# Patient Record
Sex: FEMALE | Race: WHITE | URBAN - METROPOLITAN AREA
[De-identification: names, ages, dates, MRNs, and addresses within clinical notes are randomized per-mention and may not be internally consistent; named-entity substitution may affect disease eponyms.]

---

## 2017-02-22 ENCOUNTER — OFFICE VISIT (OUTPATIENT)
Dept: FAMILY MEDICINE | Facility: CLINIC | Age: 32
End: 2017-02-22

## 2017-02-22 VITALS
SYSTOLIC BLOOD PRESSURE: 123 MMHG | TEMPERATURE: 98.6 F | HEART RATE: 82 BPM | OXYGEN SATURATION: 96 % | BODY MASS INDEX: 40.71 KG/M2 | DIASTOLIC BLOOD PRESSURE: 76 MMHG | WEIGHT: 226.2 LBS

## 2017-02-22 DIAGNOSIS — M77.8 TENDINITIS OF EXTENSOR TENDON OF RIGHT HAND: Primary | ICD-10-CM

## 2017-02-22 DIAGNOSIS — J45.30 MILD PERSISTENT ASTHMA WITHOUT COMPLICATION: ICD-10-CM

## 2017-02-22 DIAGNOSIS — J31.0 CHRONIC RHINITIS: ICD-10-CM

## 2017-02-22 RX ORDER — FLUTICASONE PROPIONATE 110 UG/1
2 AEROSOL, METERED RESPIRATORY (INHALATION) 2 TIMES DAILY
Qty: 3 INHALER | Refills: 1 | Status: SHIPPED
Start: 2017-02-22 | End: 2017-11-30

## 2017-02-22 RX ORDER — FLUTICASONE PROPIONATE 50 MCG
1-2 SPRAY, SUSPENSION (ML) NASAL DAILY
Qty: 16 G | Refills: 2 | Status: SHIPPED
Start: 2017-02-22 | End: 2018-04-30

## 2017-02-22 RX ORDER — ALBUTEROL SULFATE 90 UG/1
2 AEROSOL, METERED RESPIRATORY (INHALATION) EVERY 6 HOURS PRN
Qty: 3 INHALER | Refills: 1 | Status: SHIPPED
Start: 2017-02-22 | End: 2018-09-19

## 2017-02-22 RX ORDER — MONTELUKAST SODIUM 10 MG/1
10 TABLET ORAL AT BEDTIME
Qty: 90 TABLET | Refills: 3 | Status: SHIPPED
Start: 2017-02-22 | End: 2018-05-02

## 2017-02-22 RX ORDER — IBUPROFEN 800 MG/1
800 TABLET, FILM COATED ORAL EVERY 8 HOURS PRN
Qty: 30 TABLET | Refills: 0 | Status: SHIPPED
Start: 2017-02-22 | End: 2017-03-28

## 2017-02-22 NOTE — MR AVS SNAPSHOT
After Visit Summary   2/22/2017    Tg Calero    MRN: [id number]           Patient Information     Date Of Birth          1985        Visit Information        Provider Department      2/22/2017 2:40 PM Macey Cotton, DO [facility]        Today's Diagnoses     Tendinitis of extensor tendon of right hand    -  1    Mild persistent asthma without complication        Chronic rhinitis          Care Instructions      My Asthma Action Plan  Name: Tg Calero   YOB: 1985  Date: 2/22/2017   My doctor: Iman Fagan   My clinic: [facility]      My Control Medicine:   Fluticasone propionate (Flovent) -   mcg  Montelukast (Singulair) -  10 mg        Dose: 1 tab QHS  My Rescue Medicine: Albuterol (Proair/Ventolin/Proventil) HFA        Dose: PRN   My Asthma Severity: mild persistent  Avoid your asthma triggers: smoke, upper respiratory infections, dust mites, pollens, animal dander, mold, humidity, strong odors and fumes and exercise or sports        GREEN ZONE   Good Control    I feel good    No cough or wheeze    Can work, sleep and play without asthma symptoms       Take your asthma control medicine every day.     1. If exercise triggers your asthma, take your rescue medication    15 minutes before exercise or sports, and    During exercise if you have asthma symptoms  2. Spacer to use with inhaler: If you have a spacer, make sure to use it with your inhaler             YELLOW ZONE Getting Worse  I have ANY of these:    I do not feel good    Cough or wheeze    Chest feels tight    Wake up at night   1. Keep taking your Green Zone medications  2. Start taking your rescue medicine:    every 20 minutes for up to 1 hour. Then every 4 hours for 24-48 hours.  3. If you stay in the Yellow Zone for more than 12-24 hours, contact your doctor.  4. If you do not return to the Green Zone in 12-24 hours or you get worse, start taking your oral steroid  medicine if prescribed by your provider.           RED ZONE Medical Alert - Get Help  I have ANY of these:    I feel awful    Medicine is not helping    Breathing getting harder    Trouble walking or talking    Nose opens wide to breathe       1. Take your rescue medicine NOW  2. If your provider has prescribed an oral steroid medicine, start taking it NOW  3. Call your doctor NOW  4. If you are still in the Red Zone after 20 minutes and you have not reached your doctor:    Take your rescue medicine again and    Call 911 or go to the emergency room right away    See your regular doctor within 2 weeks of an Emergency Room or Urgent Care visit for follow-up treatment.        The above medication may be given at school or day care?: Yes and N/A (Adult Patient)  Child can carry and use inhaler(s) at school with approval of school nurse?: Yes and N/A (Adult Patient)    Electronically signed by: Macey Cotton, February 22, 2017    Annual Reminders:  Meet with Asthma Educator,  Flu Shot in the Fall, consider Pneumonia Vaccination for patients with asthma (aged 19 and older).    Pharmacy: [id number] - [place], [place] - [address] AT [facility]                    Asthma Triggers  How To Control Things That Make Your Asthma Worse    Triggers are things that make your asthma worse.  Look at the list below to help you find your triggers and what you can do about them.  You can help prevent asthma flare-ups by staying away from your triggers.      Trigger                                                          What you can do   Cigarette Smoke  Tobacco smoke can make asthma worse. Do not allow smoking in your home, car or around you.  Be sure no one smokes at a child s day care or school.  If you smoke, ask your health care provider for ways to help you quit.  Ask family members to quit too.  Ask your health care provider for a referral to Quit Plan to help you quit smoking, or  call 0-507-345-BOGZ.     Colds, Flu, Bronchitis  These are common triggers of asthma. Wash your hands often.  Don t touch your eyes, nose or mouth.  Get a flu shot every year.     Dust Mites  These are tiny bugs that live in cloth or carpet. They are too small to see. Wash sheets and blankets in hot water every week.   Encase pillows and mattress in dust mite proof covers.  Avoid having carpet if you can. If you have carpet, vacuum weekly.   Use a dust mask and HEPA vacuum.   Pollen and Outdoor Mold  Some people are allergic to trees, grass, or weed pollen, or molds. Try to keep your windows closed.  Limit time out doors when pollen count is high.   Ask you health care provider about taking medicine during allergy season.     Animal Dander  Some people are allergic to skin flakes, urine or saliva from pets with fur or feathers. Keep pets with fur or feathers out of your home.    If you can t keep the pet outdoors, then keep the pet out of your bedroom.  Keep the bedroom door closed.  Keep pets off cloth furniture and away from stuffed toys.     Mice, Rats, and Cockroaches  Some people are allergic to the waste from these pests.   Cover food and garbage.  Clean up spills and food crumbs.  Store grease in the refrigerator.   Keep food out of the bedroom.   Indoor Mold  This can be a trigger if your home has high moisture. Fix leaking faucets, pipes, or other sources of water.   Clean moldy surfaces.  Dehumidify basement if it is damp and smelly.   Smoke, Strong Odors, and Sprays  These can reduce air quality. Stay away from strong odors and sprays, such as perfume, powder, hair spray, paints, smoke incense, paint, cleaning products, candles and new carpet.   Exercise or Sports  Some people with asthma have this trigger. Be active!  Ask your doctor about taking medicine before sports or exercise to prevent symptoms.    Warm up for 5-10 minutes before and after sports or exercise.     Other Triggers of Asthma  Cold air:   Cover your nose and mouth with a scarf.  Sometimes laughing or crying can be a trigger.  Some medicines and food can trigger asthma.           Follow-ups after your visit        Follow-up notes from your care team     Return in about 2 weeks (around 3/8/2017) for Physical Exam & recheck wrist.      Who to contact     Please call your clinic at [phone] to:    Ask questions about your health    Make or cancel appointments    Discuss your medicines    Learn about your test results    Speak to your doctor   If you have compliments or concerns about an experience at your clinic, or if you wish to file a complaint, please contact [facility] Patient Relations at [phone] or email us at [email or web address]         Additional Information About Your Visit        [employer] Information     [employer] gives you secure access to your electronic health record. If you see a primary care provider, you can also send messages to your care team and make appointments. If you have questions, please call your primary care clinic.  If you do not have a primary care provider, please call [phone] and they will assist you.      [employer] is an electronic gateway that provides easy, online access to your medical records. With [employer], you can request a clinic appointment, read your test results, renew a prescription or communicate with your care team.     To access your existing account, please contact your [facility] or call [phone] for assistance.        Care EveryWhere ID     This is your Care EveryWhere ID. This could be used by other organizations to access your [facility] medical records  [id number]        Your Vitals Were     Pulse Temperature Pulse Oximetry BMI (Body Mass Index)          82 98.6  F (37  C) (Oral) 96% 40.71 kg/m2         Blood Pressure from Last 3 Encounters:   02/22/17 123/76   06/08/16 127/80   12/07/15 143/83    Weight from Last 3  Encounters:   02/22/17 226 lb 3.2 oz (102.6 kg)   06/08/16 214 lb 12.8 oz (97.4 kg)   12/07/15 212 lb (96.2 kg)              Today, you had the following     No orders found for display         Today's Medication Changes          These changes are accurate as of: 2/22/17  4:22 PM.  If you have any questions, ask your nurse or doctor.               These medicines have changed or have updated prescriptions.        Dose/Directions    * ibuprofen 800 MG tablet   Commonly known as:  ADVIL/MOTRIN   This may have changed:  Another medication with the same name was added. Make sure you understand how and when to take each.   Changed by:  Lynne Holloway MD        Dose:  800 mg   Take 1 tablet (800 mg) by mouth every 8 hours as needed for moderate pain   Quantity:  30 tablet   Refills:  1       * ibuprofen 800 MG tablet   Commonly known as:  ADVIL/MOTRIN   This may have changed:  You were already taking a medication with the same name, and this prescription was added. Make sure you understand how and when to take each.   Used for:  Tendinitis of extensor tendon of right hand   Changed by:  Macey Cotton DO        Dose:  800 mg   Take 1 tablet (800 mg) by mouth every 8 hours as needed for moderate pain   Quantity:  30 tablet   Refills:  0       order for DME   This may have changed:  additional instructions   Used for:  Tendinitis of extensor tendon of right hand   Changed by:  Macey Cotton DO        Equipment being ordered: carpal tunnel brace right wrist   Quantity:  1 each   Refills:  0       * Notice:  This list has 2 medication(s) that are the same as other medications prescribed for you. Read the directions carefully, and ask your doctor or other care provider to review them with you.      Stop taking these medicines if you haven't already. Please contact your care team if you have questions.     Dextromethorphan-Guaifenesin  MG/5ML Liqd   Stopped by:  Macey Cotton DO           naproxen 500  MG tablet   Commonly known as:  NAPROSYN   Stopped by:  Macey Cotton, DO           Tennis Elbow Neoprene Brace Misc   Stopped by:  Macey Cotton, DO                Where to get your medicines      These medications were sent to [id number] - [place], [place] - [address] [place] [address] AT [facility]  [address], [place] [address]     Phone:  [phone]     albuterol 108 (90 BASE) MCG/ACT Inhaler    fluticasone 110 MCG/ACT Inhaler    fluticasone 50 MCG/ACT spray    ibuprofen 800 MG tablet    montelukast 10 MG tablet         Some of these will need a paper prescription and others can be bought over the counter.  Ask your nurse if you have questions.     Bring a paper prescription for each of these medications     order for DME                Primary Care Provider Office Phone # Fax #    Iman Fagan [phone]       [facility] [address] [facility] [address]        Thank you!     Thank you for choosing [facility]  for your care. Our goal is always to provide you with excellent care. Hearing back from our patients is one way we can continue to improve our services. Please take a few minutes to complete the written survey that you may receive in the mail after your visit with us. Thank you!             Your Updated Medication List - Protect others around you: Learn how to safely use, store and throw away your medicines at www.disposemymeds.org.          This list is accurate as of: 2/22/17  4:22 PM.  Always use your most recent med list.                   Brand Name Dispense Instructions for use    albuterol 108 (90 BASE) MCG/ACT Inhaler    PROAIR HFA/PROVENTIL HFA/VENTOLIN HFA    3 Inhaler    Inhale 2 puffs into the lungs every 6 hours as needed for shortness of breath / dyspnea       fluticasone 110 MCG/ACT Inhaler    FLOVENT HFA    3 Inhaler    Inhale 2 puffs into the lungs 2 times daily 2 puffs        fluticasone 50 MCG/ACT spray    FLONASE    16 g    Spray 1-2 sprays into both nostrils daily       * ibuprofen 800 MG tablet    ADVIL/MOTRIN    30 tablet    Take 1 tablet (800 mg) by mouth every 8 hours as needed for moderate pain       * ibuprofen 800 MG tablet    ADVIL/MOTRIN    30 tablet    Take 1 tablet (800 mg) by mouth every 8 hours as needed for moderate pain       ketotifen 0.025 % Soln ophthalmic solution    ZADITOR    1 Bottle    Place 1 drop into both eyes every 12 hours       metFORMIN 500 MG tablet    GLUCOPHAGE    180 tablet    Take 1 tablet (500 mg) by mouth 2 times daily (with meals)       montelukast 10 MG tablet    SINGULAIR    90 tablet    Take 1 tablet (10 mg) by mouth At Bedtime       order for DME     1 each    Equipment being ordered: carpal tunnel brace right wrist       * Notice:  This list has 2 medication(s) that are the same as other medications prescribed for you. Read the directions carefully, and ask your doctor or other care provider to review them with you.

## 2017-02-22 NOTE — PATIENT INSTRUCTIONS
My Asthma Action Plan  Name: Tg Calero   YOB: 1985  Date: 2/22/2017   My doctor: Iman Fagan   My clinic: [facility]      My Control Medicine:   Fluticasone propionate (Flovent) -   mcg  Montelukast (Singulair) -  10 mg        Dose: 1 tab QHS  My Rescue Medicine: Albuterol (Proair/Ventolin/Proventil) HFA        Dose: PRN   My Asthma Severity: mild persistent  Avoid your asthma triggers: smoke, upper respiratory infections, dust mites, pollens, animal dander, mold, humidity, strong odors and fumes and exercise or sports        GREEN ZONE   Good Control    I feel good    No cough or wheeze    Can work, sleep and play without asthma symptoms       Take your asthma control medicine every day.     1. If exercise triggers your asthma, take your rescue medication    15 minutes before exercise or sports, and    During exercise if you have asthma symptoms  2. Spacer to use with inhaler: If you have a spacer, make sure to use it with your inhaler             YELLOW ZONE Getting Worse  I have ANY of these:    I do not feel good    Cough or wheeze    Chest feels tight    Wake up at night   1. Keep taking your Green Zone medications  2. Start taking your rescue medicine:    every 20 minutes for up to 1 hour. Then every 4 hours for 24-48 hours.  3. If you stay in the Yellow Zone for more than 12-24 hours, contact your doctor.  4. If you do not return to the Green Zone in 12-24 hours or you get worse, start taking your oral steroid medicine if prescribed by your provider.           RED ZONE Medical Alert - Get Help  I have ANY of these:    I feel awful    Medicine is not helping    Breathing getting harder    Trouble walking or talking    Nose opens wide to breathe       1. Take your rescue medicine NOW  2. If your provider has prescribed an oral steroid medicine, start taking it NOW  3. Call your doctor NOW  4. If you are still in the Red Zone after 20 minutes and you have not  reached your doctor:    Take your rescue medicine again and    Call 911 or go to the emergency room right away    See your regular doctor within 2 weeks of an Emergency Room or Urgent Care visit for follow-up treatment.        The above medication may be given at school or day care?: Yes and N/A (Adult Patient)  Child can carry and use inhaler(s) at school with approval of school nurse?: Yes and N/A (Adult Patient)    Electronically signed by: Macey Cotton, February 22, 2017    Annual Reminders:  Meet with Asthma Educator,  Flu Shot in the Fall, consider Pneumonia Vaccination for patients with asthma (aged 19 and older).    Pharmacy: [id number] - [place], [place] - [address] AT [facility] & [facility]                    Asthma Triggers  How To Control Things That Make Your Asthma Worse    Triggers are things that make your asthma worse.  Look at the list below to help you find your triggers and what you can do about them.  You can help prevent asthma flare-ups by staying away from your triggers.      Trigger                                                          What you can do   Cigarette Smoke  Tobacco smoke can make asthma worse. Do not allow smoking in your home, car or around you.  Be sure no one smokes at a child s day care or school.  If you smoke, ask your health care provider for ways to help you quit.  Ask family members to quit too.  Ask your health care provider for a referral to Quit Plan to help you quit smoking, or call 5-854-321-PLAN.     Colds, Flu, Bronchitis  These are common triggers of asthma. Wash your hands often.  Don t touch your eyes, nose or mouth.  Get a flu shot every year.     Dust Mites  These are tiny bugs that live in cloth or carpet. They are too small to see. Wash sheets and blankets in hot water every week.   Encase pillows and mattress in dust mite proof covers.  Avoid having carpet if you can. If you have carpet, vacuum weekly.   Use a  dust mask and HEPA vacuum.   Pollen and Outdoor Mold  Some people are allergic to trees, grass, or weed pollen, or molds. Try to keep your windows closed.  Limit time out doors when pollen count is high.   Ask you health care provider about taking medicine during allergy season.     Animal Dander  Some people are allergic to skin flakes, urine or saliva from pets with fur or feathers. Keep pets with fur or feathers out of your home.    If you can t keep the pet outdoors, then keep the pet out of your bedroom.  Keep the bedroom door closed.  Keep pets off cloth furniture and away from stuffed toys.     Mice, Rats, and Cockroaches  Some people are allergic to the waste from these pests.   Cover food and garbage.  Clean up spills and food crumbs.  Store grease in the refrigerator.   Keep food out of the bedroom.   Indoor Mold  This can be a trigger if your home has high moisture. Fix leaking faucets, pipes, or other sources of water.   Clean moldy surfaces.  Dehumidify basement if it is damp and smelly.   Smoke, Strong Odors, and Sprays  These can reduce air quality. Stay away from strong odors and sprays, such as perfume, powder, hair spray, paints, smoke incense, paint, cleaning products, candles and new carpet.   Exercise or Sports  Some people with asthma have this trigger. Be active!  Ask your doctor about taking medicine before sports or exercise to prevent symptoms.    Warm up for 5-10 minutes before and after sports or exercise.     Other Triggers of Asthma  Cold air:  Cover your nose and mouth with a scarf.  Sometimes laughing or crying can be a trigger.  Some medicines and food can trigger asthma.

## 2017-02-22 NOTE — PROGRESS NOTES
HPI:       Tg Calero is a 31 year old  female with a significant past medical history of asthma who presents for the following:     RIGHT WRIST PAIN -   - History of carpal tunnel 3 or 4 years ago, improved with splints. Lost right wrist splint.   - Symptoms started a few days ago after chopping chicken about 30 minutes.   - Pain is located on dorsal aspect of wrist across the back from radial to ulnar side.   - No pain at rest, but pain with activity. 6/10 at worst. Throbbing intermittent 3-4/10.   - Pain does not radiate.   - No numbness or tingling.   - No weakness.   - Has not taken any medications, ice, or heat.   - Used wrist splint upside down to immobilize, and this helped.     ASTHMA -   - ACT reviewed, 23 - controlled symptoms   - uses albuterol when sick or when physically active (exertion)  - requests refill of all asthma medications         PMHX:     Patient Active Problem List   Diagnosis     Allergic rhinitis due to animal dander     Irregular menses     PCOS (polycystic ovarian syndrome)     Mild persistent asthma     Vitamin D deficiency disease     Obese     Female infertility     Tennis elbow, right     Pap smear for cervical cancer screening     Current Outpatient Prescriptions   Medication     fluticasone (FLOVENT HFA) 110 MCG/ACT Inhaler     fluticasone (FLONASE) 50 MCG/ACT spray     montelukast (SINGULAIR) 10 MG tablet     albuterol (PROAIR HFA/PROVENTIL HFA/VENTOLIN HFA) 108 (90 BASE) MCG/ACT Inhaler     ibuprofen (ADVIL/MOTRIN) 800 MG tablet     order for DME     ketotifen (ZADITOR) 0.025 % SOLN ophthalmic solution     metFORMIN (GLUCOPHAGE) 500 MG tablet     ibuprofen (ADVIL,MOTRIN) 800 MG tablet     No current facility-administered medications for this visit.       Allergies   Allergen Reactions     Animal Dander      Seasonal Allergies             Review of Systems:   RESPIRATORY: no coughing, no wheezing, no shortness of breath  MSK: right wrist pain, decreased range of  motion          Physical Exam:     /76 (BP Location: Left arm, Patient Position: Chair, Cuff Size: Adult Large)  Pulse 82  Temp 98.6  F (37  C) (Oral)  Wt 226 lb 3.2 oz (102.6 kg)  SpO2 96%  BMI 40.71 kg/m2  GENERAL: alert, oriented, obese, no acute distress  HEART: RRR, no MRG  LUNGS: CTAB, unlabored  MSK: Right dorsal wrist is tender to palpation. Wrist flexion and extension is limited. Finger flexion and extension is intact.   NEURO: hand sensation intact in all dermatomes; muscle strength 5/5 in wrist flexion, wrist extension, finger flexion, and pinky abduction     Assessment and Plan     1. Tendinitis of extensor tendon of right hand  Symptoms most consistent with tendonitis of wrist extensors of right hand secondary to overuse. Will ice, start antiinflammatories with ibuprofen, and immobilize with wrist splint. Anticipate significant improvement in symptoms over the next week.   - ibuprofen (ADVIL/MOTRIN) 800 MG tablet; Take 1 tablet (800 mg) by mouth every 8 hours as needed for moderate pain  Dispense: 30 tablet; Refill: 0  - order for DME; Equipment being ordered: carpal tunnel brace right wrist  Dispense: 1 each; Refill: 0  - WRIST COCK-UP NON-MOLDED    2. Mild persistent asthma without complication  Due for AAP. Reviewed ACT. Symptoms are well controlled. Renewed asthma medications and filled out AAP. See AVS for details.   - fluticasone (FLOVENT HFA) 110 MCG/ACT Inhaler; Inhale 2 puffs into the lungs 2 times daily 2 puffs  Dispense: 3 Inhaler; Refill: 1  - montelukast (SINGULAIR) 10 MG tablet; Take 1 tablet (10 mg) by mouth At Bedtime  Dispense: 90 tablet; Refill: 3  - albuterol (PROAIR HFA/PROVENTIL HFA/VENTOLIN HFA) 108 (90 BASE) MCG/ACT Inhaler; Inhale 2 puffs into the lungs every 6 hours as needed for shortness of breath / dyspnea  Dispense: 3 Inhaler; Refill: 1    3. Chronic rhinitis - allergic trigger for asthma, refilled allergy medications.   - fluticasone (FLONASE) 50 MCG/ACT spray; Spray  1-2 sprays into both nostrils daily  Dispense: 16 g; Refill: 2  - montelukast (SINGULAIR) 10 MG tablet; Take 1 tablet (10 mg) by mouth At Bedtime  Dispense: 90 tablet; Refill: 3    Options for treatment and follow-up care were reviewed with the patient and/or guardian. Tg Calero and/or guardian engaged in the decision making process and verbalized understanding of the options discussed and agreed with the final plan.    Macey Cotton DO      Precepted today with: Matt Salomon MD

## 2017-02-22 NOTE — NURSING NOTE
ACT--given to pt to fill out  Pap due--inform pt that she is due for a pap. Pt states understanding and will make physical appt with primary doctor  Offer flu vaccine--pt decline

## 2017-02-22 NOTE — PROGRESS NOTES
Preceptor Attestation:  Patient's case reviewed and discussed with Macey Cotton DO resident and I evaluated the patient. I agree with written assessment and plan of care.  Supervising Physician:Matt Salomon MD    [facility]

## 2017-02-25 ASSESSMENT — ASTHMA QUESTIONNAIRES: ACT_TOTALSCORE: 23

## 2017-03-24 ENCOUNTER — OFFICE VISIT (OUTPATIENT)
Dept: FAMILY MEDICINE | Facility: CLINIC | Age: 32
End: 2017-03-24

## 2017-03-24 ENCOUNTER — RECORDS - HEALTHEAST (OUTPATIENT)
Dept: ADMINISTRATIVE | Facility: OTHER | Age: 32
End: 2017-03-24

## 2017-03-24 VITALS
RESPIRATION RATE: 22 BRPM | WEIGHT: 223 LBS | TEMPERATURE: 98 F | HEIGHT: 63 IN | SYSTOLIC BLOOD PRESSURE: 115 MMHG | BODY MASS INDEX: 39.51 KG/M2 | DIASTOLIC BLOOD PRESSURE: 70 MMHG | OXYGEN SATURATION: 100 % | HEART RATE: 67 BPM

## 2017-03-24 DIAGNOSIS — N91.2 ABSENCE OF MENSTRUATION: Primary | ICD-10-CM

## 2017-03-24 DIAGNOSIS — Z32.01 PREGNANCY TEST POSITIVE: ICD-10-CM

## 2017-03-24 LAB
HBA1C MFR BLD: 5.6 % (ref 4.1–5.7)
HCG UR QL: POSITIVE

## 2017-03-24 RX ORDER — PRENATAL VIT/IRON FUM/FOLIC AC 27MG-0.8MG
1 TABLET ORAL DAILY
Qty: 100 TABLET | Refills: 11 | Status: SHIPPED | OUTPATIENT
Start: 2017-03-24 | End: 2018-05-02

## 2017-03-24 NOTE — PROGRESS NOTES
"       HPI:       Tg Calero is a 31 year old  female with a significant past medical history of PCOS, obesity who presents for the new concern(s) of:    #Positive home UPT:   was LMP, PCOS and irregular periods.  Had some spotting 3-4 weeks ago, so she is not sure if that was a period or not    2 weeks of feeling \"different\": breast tenderness, nausea, feeling like she was pregnant which is why she took the test  Denies feeling any flutters/movement, although has not been paying attention  She is not taking a prenatal vitamin    - ages 13 and 10- she and partner have been trying to conceive for seven years  Will follow at [facility] for prenatal care         PMHX:     Patient Active Problem List   Diagnosis     Allergic rhinitis due to animal dander     Irregular menses     PCOS (polycystic ovarian syndrome)     Mild persistent asthma     Vitamin D deficiency disease     Obese     Female infertility     Pap smear for cervical cancer screening       Current Outpatient Prescriptions   Medication Sig Dispense Refill     Prenatal Vit-Fe Fumarate-FA (PRENATAL MULTIVITAMIN  PLUS IRON) 27-0.8 MG TABS per tablet Take 1 tablet by mouth daily 100 tablet 11     fluticasone (FLOVENT HFA) 110 MCG/ACT Inhaler Inhale 2 puffs into the lungs 2 times daily 2 puffs 3 Inhaler 1     fluticasone (FLONASE) 50 MCG/ACT spray Spray 1-2 sprays into both nostrils daily 16 g 2     montelukast (SINGULAIR) 10 MG tablet Take 1 tablet (10 mg) by mouth At Bedtime 90 tablet 3     albuterol (PROAIR HFA/PROVENTIL HFA/VENTOLIN HFA) 108 (90 BASE) MCG/ACT Inhaler Inhale 2 puffs into the lungs every 6 hours as needed for shortness of breath / dyspnea 3 Inhaler 1     ibuprofen (ADVIL/MOTRIN) 800 MG tablet Take 1 tablet (800 mg) by mouth every 8 hours as needed for moderate pain 30 tablet 0     order for DME Equipment being ordered: carpal tunnel brace right wrist 1 each 0     ketotifen (ZADITOR) 0.025 % SOLN ophthalmic solution Place 1 " "drop into both eyes every 12 hours 1 Bottle 11     metFORMIN (GLUCOPHAGE) 500 MG tablet Take 1 tablet (500 mg) by mouth 2 times daily (with meals) 180 tablet 3     ibuprofen (ADVIL,MOTRIN) 800 MG tablet Take 1 tablet (800 mg) by mouth every 8 hours as needed for moderate pain 30 tablet 1          Allergies   Allergen Reactions     Animal Dander      Seasonal Allergies        Results for orders placed or performed in visit on 03/24/17 (from the past 24 hour(s))   HCG Qualitative Urine (UPT)  ([facility])   Result Value Ref Range    HCG Qual Urine POSITIVE Negative            Review of Systems:   5-Point Review of Systems Negative -- Except as noted above.          Physical Exam:     Vitals:    03/24/17 1024   BP: 115/70   Pulse: 67   Resp: 22   Temp: 98  F (36.7  C)   SpO2: 100%   Weight: 223 lb (101.2 kg)   Height: 5' 2.5\" (158.8 cm)     Body mass index is 40.14 kg/(m^2).    Gen alert pleasant NAD  Abd obese, nontender-  fundus palpable approx 4-5 cm below umbilicus- bedside US showing visible fetus, cardiac activity and movement seen  Ext no swelling or calf tenderness  Psyc mood and affect bright      Assessment and Plan       (Z32.01) Pregnancy test positive  Comment: Unsure of LMP, bedside US and exam suggesting 8-10 week fetus.   Plan: Hemoglobin A1c ([facility]), US OB <14 WKS SINGLE         OR FIRST GESTATION, Prenatal Vit-Fe Fumarate-FA        (PRENATAL MULTIVITAMIN  PLUS IRON) 27-0.8 MG         TABS per tablet          -Schedule dating US, then NOB visit    -OB intake done today   -Start prenatal vitamin   -Stop ibuprofen   -Continue metformin for now:  Will check a1c today   -Continue Singulair:  preg category B      Options for treatment and follow-up care were reviewed with the patient and/or guardian. Tg Calero and/or guardian engaged in the decision making process and verbalized understanding of the options discussed and agreed with the final plan.      Joy Amato MD      Precepted today with: " Rylie Rush MD

## 2017-03-24 NOTE — NURSING NOTE
OB intake completed while pt here in clinic with FOB. Dating U/S scheduled with [facility] for 3/27/17 at 1pm. Instructed to arrive 12:45PM and go with full bladder to Radiology Dept.

## 2017-03-24 NOTE — MR AVS SNAPSHOT
After Visit Summary   3/24/2017    Tg Calero    MRN: [id number]           Patient Information     Date Of Birth          1985        Visit Information        Provider Department      3/24/2017 10:20 AM Joy Amato MD [facility]        Today's Diagnoses     Absence of menstruation    -  1    Pregnancy test positive          Care Instructions    Schedule ultrasound, return for you first OB visit  Start prenatal vitamin  No cold medicine, no ibuprofen or aspirin.  Tylenol is safe  Continue metformin    Congratulations!        Follow-ups after your visit        Future tests that were ordered for you today     Open Future Orders        Priority Expected Expires Ordered    US OB <14 WKS SINGLE OR FIRST GESTATION Routine  3/24/2018 3/24/2017            Who to contact     Please call your clinic at [phone] to:    Ask questions about your health    Make or cancel appointments    Discuss your medicines    Learn about your test results    Speak to your doctor   If you have compliments or concerns about an experience at your clinic, or if you wish to file a complaint, please contact [facility] Patient Relations at [phone] or email us at [email or web address]         Additional Information About Your Visit        MyChart Information     eZonot gives you secure access to your electronic health record. If you see a primary care provider, you can also send messages to your care team and make appointments. If you have questions, please call your primary care clinic.  If you do not have a primary care provider, please call [phone] and they will assist you.      eZonot is an electronic gateway that provides easy, online access to your medical records. With Jumpstarter, you can request a clinic appointment, read your test results, renew a prescription or communicate with your care team.     To access your existing account, please contact your  "[facility] or call [phone] for assistance.        Care EveryWhere ID     This is your Care EveryWhere ID. This could be used by other organizations to access your [facility] medical records  [id number]        Your Vitals Were     Pulse Temperature Respirations Height Last Period Pulse Oximetry    67 98  F (36.7  C) 22 5' 2.5\" (158.8 cm) 12/02/2016 100%    BMI (Body Mass Index)                   40.14 kg/m2            Blood Pressure from Last 3 Encounters:   03/24/17 115/70   02/22/17 123/76   06/08/16 127/80    Weight from Last 3 Encounters:   03/24/17 223 lb (101.2 kg)   02/22/17 226 lb 3.2 oz (102.6 kg)   06/08/16 214 lb 12.8 oz (97.4 kg)              We Performed the Following     HCG Qualitative Urine (UPT)  ([facility])     Hemoglobin A1c ([facility])          Today's Medication Changes          These changes are accurate as of: 3/24/17 11:19 AM.  If you have any questions, ask your nurse or doctor.               Start taking these medicines.        Dose/Directions    prenatal multivitamin  plus iron 27-0.8 MG Tabs per tablet   Used for:  Pregnancy test positive   Started by:  Joy Amato MD        Dose:  1 tablet   Take 1 tablet by mouth daily   Quantity:  100 tablet   Refills:  11            Where to get your medicines      These medications were sent to [id number] - [place], [place] - [address], [place] [address]     Phone:  [phone]     prenatal multivitamin  plus iron 27-0.8 MG Tabs per tablet                Primary Care Provider Office Phone # Fax #    Imanwoodrow Fagan [phone]       [facility] [address] [facility] [address]        Thank you!     Thank you for choosing [facility]  for your care. Our goal is always to provide you with excellent care. Hearing back from our patients is one way we can continue to improve our " services. Please take a few minutes to complete the written survey that you may receive in the mail after your visit with us. Thank you!             Your Updated Medication List - Protect others around you: Learn how to safely use, store and throw away your medicines at www.disposemymeds.org.          This list is accurate as of: 3/24/17 11:19 AM.  Always use your most recent med list.                   Brand Name Dispense Instructions for use    albuterol 108 (90 BASE) MCG/ACT Inhaler    PROAIR HFA/PROVENTIL HFA/VENTOLIN HFA    3 Inhaler    Inhale 2 puffs into the lungs every 6 hours as needed for shortness of breath / dyspnea       fluticasone 110 MCG/ACT Inhaler    FLOVENT HFA    3 Inhaler    Inhale 2 puffs into the lungs 2 times daily 2 puffs       fluticasone 50 MCG/ACT spray    FLONASE    16 g    Spray 1-2 sprays into both nostrils daily       * ibuprofen 800 MG tablet    ADVIL/MOTRIN    30 tablet    Take 1 tablet (800 mg) by mouth every 8 hours as needed for moderate pain       * ibuprofen 800 MG tablet    ADVIL/MOTRIN    30 tablet    Take 1 tablet (800 mg) by mouth every 8 hours as needed for moderate pain       ketotifen 0.025 % Soln ophthalmic solution    ZADITOR    1 Bottle    Place 1 drop into both eyes every 12 hours       metFORMIN 500 MG tablet    GLUCOPHAGE    180 tablet    Take 1 tablet (500 mg) by mouth 2 times daily (with meals)       montelukast 10 MG tablet    SINGULAIR    90 tablet    Take 1 tablet (10 mg) by mouth At Bedtime       order for DME     1 each    Equipment being ordered: carpal tunnel brace right wrist       prenatal multivitamin  plus iron 27-0.8 MG Tabs per tablet     100 tablet    Take 1 tablet by mouth daily       * Notice:  This list has 2 medication(s) that are the same as other medications prescribed for you. Read the directions carefully, and ask your doctor or other care provider to review them with you.

## 2017-03-24 NOTE — PATIENT INSTRUCTIONS
Schedule ultrasound, return for you first OB visit  Start prenatal vitamin  No cold medicine, no ibuprofen or aspirin.  Tylenol is safe  Continue metformin    Congratulations!

## 2017-03-27 ENCOUNTER — HOSPITAL ENCOUNTER (OUTPATIENT)
Dept: ULTRASOUND IMAGING | Facility: HOSPITAL | Age: 32
Discharge: HOME | End: 2017-03-27
Attending: FAMILY MEDICINE

## 2017-03-27 DIAGNOSIS — Z32.01 ENCOUNTER FOR PREGNANCY TEST, RESULT POSITIVE: ICD-10-CM

## 2017-03-28 NOTE — NURSING NOTE
Intake: Age 31. Pt is currently doing part time college. Have two daughters at home age 13 and 10.  Both term vaginal delivery and no complication during pregnancy nor delivery. LMP 16 and reports passing what appeared to be like  product of conception tissue. If this was a miscarriage she would be a  with this pregnancy.Having nausea this pregnancy. First daughter is a different FOB. Pt and current FOB are excited as they have been trying for years now. No previous treatment for infertility. PCOS and on Metformin. Does have mild persistent asthma and uses Flovent, Albuterol, and Singulair. Also have allergic rhinitis and uses Zyrtec and Flonase.  Significant family history for asthma. Reports first daughter have known heart murmur, no surgery and unclear history. Pt reports concern with weight and risk for diabetes. Denies concern with living/fincial/social situation.  Will be seeing Dr. Wright for OB care.     Addendum: Dating U/S completed on 3/27/17 at [facility] with SHANTE 10/21/17.     Average Risk Category  No significant risk factors: No    At Risk Category (up to 3)  Teen pregnancy: No  Poor social situation: No  Domestic abuse: No  Financial difficulties: No  Smoker: No  H/O  deliver: No  H/O drug abuse: No  Non-English speaking: No  Advanced maternal age: No  GDM risks: No  Previous C/S: No  H/O PIH: No  H/O STIs: No  H/O mental health concerns: No  Onset care > 20 weeks: No  Other:      High Risk Category (4 or more At Risk or)  Diabetes/GDM: No  Multiple gestation: No  Chronic hypertension: No  Significant hx of asthma: No  Fetal demise > 20 weeks: No  Positive tox screen: No  Current mental health treatment: No  Other:      Risk: Average Risk  Date determined: 3/28/17

## 2017-03-29 DIAGNOSIS — Z32.01 PREGNANCY TEST POSITIVE: ICD-10-CM

## 2017-03-29 NOTE — PROGRESS NOTES
Please call:  Stephen Mas,  Your A1c, or diabetes test, came back as normal- this is great to see!  Let's plan to have you continue metformin until you have your first OB visit- then you can discuss the plan for continuing or stopping this medicine.  Congratulations!  Dr Amato

## 2017-03-31 ENCOUNTER — OFFICE VISIT (OUTPATIENT)
Dept: FAMILY MEDICINE | Facility: CLINIC | Age: 32
End: 2017-03-31

## 2017-03-31 VITALS
RESPIRATION RATE: 16 BRPM | DIASTOLIC BLOOD PRESSURE: 65 MMHG | OXYGEN SATURATION: 100 % | TEMPERATURE: 97.9 F | WEIGHT: 218.4 LBS | SYSTOLIC BLOOD PRESSURE: 100 MMHG | BODY MASS INDEX: 38.7 KG/M2 | HEIGHT: 63 IN | HEART RATE: 67 BPM

## 2017-03-31 DIAGNOSIS — O09.01 SUPERVISION OF PREGNANCY WITH HISTORY OF INFERTILITY, FIRST TRIMESTER: Primary | ICD-10-CM

## 2017-03-31 PROBLEM — O09.00 SUPERVISION OF PREGNANCY WITH HISTORY OF INFERTILITY: Status: ACTIVE | Noted: 2017-03-31

## 2017-03-31 PROBLEM — E66.9 OBESITY (BMI 35.0-39.9 WITHOUT COMORBIDITY): Status: ACTIVE | Noted: 2017-03-31

## 2017-03-31 LAB
BACTERIA: NORMAL
BILIRUBIN UR: NEGATIVE
BLOOD UR: NEGATIVE
CLUE CELLS: NORMAL
GLUCOSE URINE: NEGATIVE
HIV 1+2 AB+HIV1 P24 AG SERPL QL IA: NEGATIVE
KETONES UR QL: NEGATIVE
LEUKOCYTE ESTERASE UR: NEGATIVE
MOTILE TRICHOMONAS: NEGATIVE
NITRITE UR QL STRIP: NEGATIVE
ODOR: NORMAL
PH UR STRIP: 7.5 [PH] (ref 5–7)
PH WET PREP: NORMAL
PROTEIN UR: ABNORMAL
SP GR UR STRIP: 1.02
UROBILINOGEN UR STRIP-ACNC: ABNORMAL
WBC WET PREP: NORMAL
YEAST: NORMAL

## 2017-03-31 NOTE — PATIENT INSTRUCTIONS
Important Takeaway Points From This Visit:    Congratulations again on your pregnancy! You are 10 weeks, 6 days today.    Again your due date is 10/21/17    We will call with the results of your tests we took today.    Follow-up with us again in 4 weeks.    As always, please call with any questions or concerns. I look forward to seeing you again soon!    Take care,  Dr. Eddy    Your current medication list is printed. Please keep this with you - it is helpful to bring this current list to any other medical appointments. It can also be helpful if you ever go to the emergency room or hospital.    If you had lab testing today we will call you with the results. The phone number we will call with your results is # [phone] (home) . If this is not the best number please call our clinic and change the number.    If you need any refills, please call your pharmacy and they will contact us.    If you have any further concerns or wish to schedule another appointment, please call our office at [phone] during normal business hours (8-5, M-F).    If you have urgent medical questions that cannot wait, you may call [phone] at any time of day.    If you have a medical emergency, please call [phone].    Thank you for coming to [facility].

## 2017-03-31 NOTE — PROGRESS NOTES
Preceptor Attestation:  Patient's case reviewed and discussed with Joy Amato MD Patient seen and discussed with the resident.. I agree with assessment and plan of care.  Supervising Physician:  Rylie Rush MD  [facility]

## 2017-03-31 NOTE — MR AVS SNAPSHOT
After Visit Summary   3/31/2017    Tg Calero    MRN: [id number]           Patient Information     Date Of Birth          1985        Visit Information        Provider Department      3/31/2017 10:00 AM Frederick Eddy MD [facility]        Today's Diagnoses     Supervision of pregnancy with history of infertility, first trimester    -  1      Care Instructions    Important Takeaway Points From This Visit:    Congratulations again on your pregnancy! You are 10 weeks, 6 days today.    Again your due date is 10/21/17    We will call with the results of your tests we took today.    Follow-up with us again in 4 weeks.    As always, please call with any questions or concerns. I look forward to seeing you again soon!    Take care,  Dr. Eddy    Your current medication list is printed. Please keep this with you - it is helpful to bring this current list to any other medical appointments. It can also be helpful if you ever go to the emergency room or hospital.    If you had lab testing today we will call you with the results. The phone number we will call with your results is # [phone] (home) . If this is not the best number please call our clinic and change the number.    If you need any refills, please call your pharmacy and they will contact us.    If you have any further concerns or wish to schedule another appointment, please call our office at [phone] during normal business hours (8-5, M-F).    If you have urgent medical questions that cannot wait, you may call [phone] at any time of day.    If you have a medical emergency, please call [phone].    Thank you for coming to [facility].            Follow-ups after your visit        Who to contact     Please call your clinic at [phone] to:    Ask questions about your health    Make or cancel appointments    Discuss your medicines    Learn about your test results    Speak to your doctor   If you  "have compliments or concerns about an experience at your clinic, or if you wish to file a complaint, please contact [facility] Patient Relations at [phone] or email us at [email or web address]         Additional Information About Your Visit        [employer] Information     zappit gives you secure access to your electronic health record. If you see a primary care provider, you can also send messages to your care team and make appointments. If you have questions, please call your primary care clinic.  If you do not have a primary care provider, please call [phone] and they will assist you.      zappit is an electronic gateway that provides easy, online access to your medical records. With zappit, you can request a clinic appointment, read your test results, renew a prescription or communicate with your care team.     To access your existing account, please contact your [facility] or call [phone] for assistance.        Care EveryWhere ID     This is your Care EveryWhere ID. This could be used by other organizations to access your [facility] medical records  [id number]        Your Vitals Were     Pulse Temperature Respirations Height Last Period Pulse Oximetry    67 97.9  F (36.6  C) (Oral) 16 5' 2.5\" (158.8 cm) 12/02/2016 100%    BMI (Body Mass Index)                   39.31 kg/m2            Blood Pressure from Last 3 Encounters:   03/31/17 100/65   03/24/17 115/70   02/22/17 123/76    Weight from Last 3 Encounters:   03/31/17 218 lb 6.4 oz (99.1 kg)   03/24/17 223 lb (101.2 kg)   02/22/17 226 lb 3.2 oz (102.6 kg)              We Performed the Following     ABO/Rh Type-HML ([facility])     Antibody Screen ([facility])     Chlamydia/Gono Amplified ([facility])     Culture Urine ([facility])     GYN Cytology ([facility])     Hemoglobin (HGB) ([facility])     Hepatitis B Surface Ag ([facility])     HIV Ag/Ab Screen [place] ([facility])     " Rubella  IgG ([facility])     Syphilis Screen [place] ([facility])     Urinalysis([facility])     Wet Prep ([facility])        Primary Care Provider Office Phone # Fax #    Iman Jocelyn Fagan [phone]       [facility] [address] [place] [address]        Thank you!     Thank you for choosing [facility]  for your care. Our goal is always to provide you with excellent care. Hearing back from our patients is one way we can continue to improve our services. Please take a few minutes to complete the written survey that you may receive in the mail after your visit with us. Thank you!             Your Updated Medication List - Protect others around you: Learn how to safely use, store and throw away your medicines at www.disposemymeds.org.          This list is accurate as of: 3/31/17 11:31 AM.  Always use your most recent med list.                   Brand Name Dispense Instructions for use    albuterol 108 (90 BASE) MCG/ACT Inhaler    PROAIR HFA/PROVENTIL HFA/VENTOLIN HFA    3 Inhaler    Inhale 2 puffs into the lungs every 6 hours as needed for shortness of breath / dyspnea       fluticasone 110 MCG/ACT Inhaler    FLOVENT HFA    3 Inhaler    Inhale 2 puffs into the lungs 2 times daily 2 puffs       fluticasone 50 MCG/ACT spray    FLONASE    16 g    Spray 1-2 sprays into both nostrils daily       ketotifen 0.025 % Soln ophthalmic solution    ZADITOR    1 Bottle    Place 1 drop into both eyes every 12 hours       metFORMIN 500 MG tablet    GLUCOPHAGE    180 tablet    Take 1 tablet (500 mg) by mouth 2 times daily (with meals)       montelukast 10 MG tablet    SINGULAIR    90 tablet    Take 1 tablet (10 mg) by mouth At Bedtime       order for DME     1 each    Equipment being ordered: carpal tunnel brace right wrist       prenatal multivitamin  plus iron 27-0.8 MG Tabs per tablet     100 tablet    Take 1 tablet by mouth daily

## 2017-03-31 NOTE — PROGRESS NOTES
First Obstetric Visit       HPI       Tg Calero is a 31 year old  woman who presents for an 1st prenatal visit at 10w6d pregnant with SHNATE of  Oct 21, 2017 by 10w2d ultrasound. Patient's last menstrual period was 2016 (approximate). She has had spotting once in February since her LMP. She has had mild nausea and heartburn treated with Tums. She did lose weight since the last visit, ~ 5 pounds in 2 weeks. She reports she has changed her diet to include more water, whole grains, lean meats, etc. This was a semi-planned pregnancy, her and her  have been trying for ~ 7 years with unprotected sex. After losing some weight and starting to be more consistent with her metformin they were successful. She states she did have a miscarriage late last year (October-November). She has no other concerns today.     Labor Risk Assessment   Is the patient's age <18 or >40?    No  Patint's BMI is Body mass index is 39.31 kg/(m^2). Does patient have a BMI < 18.5?    No  If previous pregnancy, was delivery within previous 6 months?    No, 2 kids 13 and 10 years old  Have you ever delivered a baby prior to 37 weeks gestation?    No, all term.  Did conception for this pregnancy occur via In Vitro Fertilization?    No  Are you carrying twins?    No    Average risk pregnancy  Past Medical History  Past Medical History:   Diagnosis Date     Allergic rhinitis due to animal dander      Intermittent asthma      Irregular menses      PCOS (polycystic ovarian syndrome) 3/22/2013     Rhinitis, allergic to other allergen      Seasonal allergic conjunctivitis      Seasonal allergic rhinitis  skin tests pos. for: cat/dog/M/T/RW --per JLM     Tennis elbow, right      Do you have a history of any of the following medical conditions?    Condition Yes/No   Hypertension No   Heart disease, mitral valve prolapse, rheumatic fever No   Asthma or another chronic lung disease Yes, Asthma   An autoimmune disorder No    Kidney disease No   Frequent urinary tract infections No   Migraine headaches No   Stroke, loss of sensation/function, seizures, or other neuro problem Yes, one seizure at age 7   Diabetes No   Thyroid problems or have you taken thyroid medication No   Hepatitis, liver disease, jaundice No   Blood clots, phlebitis, pulmonary embolism or varicose veins No   Excessive bleeding after surgery or dental work No   Heavy menstrual periods requiring treatment No   Anemia No   Blood transfusions No        Would you refuse a blood transfusion? No   Breast problems No   Abnormalities of the uterus No   Abnormal pap smear No   Have you been treated for an emotional disturbance? No   Have you been physically, sexually, or emotionally hurt by someone? No   Have you been in a major accident or suffered serious trauma? No   Have you had surgical procedures? Yes, at 7 months, hernia repair.        Have you ever had any complications from anesthesia? No   Have you ever been hospitalized for a nonsurgical reason? Yes, left hand bacterial infection a few years ago.     Notes for positives: Hx of mild persistent asthma well controlled on flovent, flonase, montelukast, and albuterol.     Prenatal Genetic Screening    Do you have a history of any of the following Yes/No        A metabolic disorder (e.g. Insulin-dependent DM, PKU) No        Recurrent pregnancy loss No     Do you, the baby's father, or anyone in your families have Yes/No        Thalassemia AND MCV <80 No        Hemophilia No        Neural tube defect No        Congenital heart defect No        Sickle cell disease or trait No        Muscular dystrophy No        Cystic fibrosis No        Mental retardation or autism No        Down's syndrome No        Edward-Sach's disease No        Mecosta's chorea No        Any other inherited genetic or chromosomal disorder No        A child with birth defects not listed above No     Infection History    At high risk for coming in  contact with HIV No   Ever treated for tuberculosis No   Ever received the BCG vaccine for tuberculosis No   Ever had genital herpes (or has your partner) No   Had a rash or viral illness since LMP No   Ever had a sexually transmitted infection No   Ever had chicken pox or the vaccine Yes   Ever had any other serious infectious disease No   Up to date with immunizations Yes, but declined flu shot.     Habits  Non-smoker and no smoke exposure, No ETOH, No street drugs, started walking for regular exercise.  Current medications are:  Current Outpatient Prescriptions   Medication Sig Dispense Refill     Prenatal Vit-Fe Fumarate-FA (PRENATAL MULTIVITAMIN  PLUS IRON) 27-0.8 MG TABS per tablet Take 1 tablet by mouth daily 100 tablet 11     fluticasone (FLOVENT HFA) 110 MCG/ACT Inhaler Inhale 2 puffs into the lungs 2 times daily 2 puffs 3 Inhaler 1     fluticasone (FLONASE) 50 MCG/ACT spray Spray 1-2 sprays into both nostrils daily 16 g 2     montelukast (SINGULAIR) 10 MG tablet Take 1 tablet (10 mg) by mouth At Bedtime 90 tablet 3     albuterol (PROAIR HFA/PROVENTIL HFA/VENTOLIN HFA) 108 (90 BASE) MCG/ACT Inhaler Inhale 2 puffs into the lungs every 6 hours as needed for shortness of breath / dyspnea 3 Inhaler 1     ketotifen (ZADITOR) 0.025 % SOLN ophthalmic solution Place 1 drop into both eyes every 12 hours 1 Bottle 11     metFORMIN (GLUCOPHAGE) 500 MG tablet Take 1 tablet (500 mg) by mouth 2 times daily (with meals) 180 tablet 3     order for DME Equipment being ordered: carpal tunnel brace right wrist 1 each 0       REVIEW OF SYSTEMS  C: NEGATIVE for fever, chills, unwanted change in weight  I: NEGATIVE for worrisome rashes, moles or lesions  E: NEGATIVE for vision changes or irritation  ENT: NEGATIVE for ear, mouth and throat problems  R: Cough. NEGATIVE for significant SOB  B: NEGATIVE for masses, tenderness or discharge  CV: NEGATIVE for chest pain or peripheral edema  GI: Nausea. Heartburn. NEGATIVE for abdominal  "pain, or change in bowel habits  : Increased urinary frequency. Irregular periods. NEGATIVE for other unusual urinary or vaginal symptoms.  M: NEGATIVE for significant arthralgias or myalgia  N: NEGATIVE for weakness, dizziness or paresthesias  P: NEGATIVE for changes in mood or affect  ====================================================    PHYSICAL EXAM:  /65  Pulse 67  Temp 97.9  F (36.6  C) (Oral)  Resp 16  Ht 5' 2.5\" (158.8 cm)  Wt 218 lb 6.4 oz (99.1 kg)  LMP 12/02/2016 (Approximate)  SpO2 100%  BMI 39.31 kg/m2       GENERAL:  Pleasant, obese, pregnant female, alert, well groomed.  SKIN:  Warm and dry, without lesions or rashes  HEAD: Symmetrical features.  EYES:  PERRL, EOMI.  MOUTH:  Buccal mucosa pink, moist without lesions.    NECK:  Thyroid without enlargement and nodules.  Lymph nodes not palpable.  LUNGS:  Clear to auscultation.  BREAST:  Symmetrical.  No dominant, fixed or suspicious masses are noted.      HEART:  RRR without murmur.  ABDOMEN: Soft without masses, tenderness or organomegaly.  No CVA tenderness. No scars noted. FHT not done today.  MUSCULOSKELETAL:  Full range of motion  EXTREMITIES:  No edema. No significant varicosities.   GENITALIA: No lesions noted. Bimanual exam normal.  VAGINA:  Pink, normal rugae and discharge white  CERVIX:  Closed.  UTERUS: Anteverted and Midposition, non-tender.  ADNEXA: Without masses or tenderness, but difficult do to obesity.  =========================================    ASSESSMENT/PLAN: 10w6d today, due on 10/21. Limited complaints with some nausea and heartburn. Hx of PCOS and Asthma and will continue to treat during pregnancy.     Hx of PCOS - After discussion with patient will continue low dose metformin for possible benefits of decreasing fetal loss, gestational diabetes, and pre-eclampsia despite limited evidence in literature.    Mild persistent asthma - Continue inhalers, flonase, and montelukast. Discussed risk benefits with patient " regarding montelukast and deemed important to stay on it during pregnancy for benefit to asthma.    Average risk pregnancy  Instructed on best evidence for:     Weight gain for her BMI for pregnancy, Recommended weight gain: 11-20 lbs    Healthy diet and foods to avoid    Exercise and activity during pregnancy      Avoiding exposure to toxoplasmosis     Maintenance of a generally healthy lifestyle.   Follow-up in 4 weeks, sooner if problems arise  Screening for abuse and high risk practices  Screening Labs:    ABO/Rh Type-HML ([facility])    Antibody Screen ([facility])    Urinalysis([facility])    Hemoglobin (HGB) ([facility])    Hepatitis B Surface Ag ([facility])    Syphilis Screen [place] ([facility])    HIV Ag/Ab Screen [place] ([facility])    Culture Urine ([facility])    Rubella  IgG ([facility])    Chlamydia/Gono Amplified ([facility])    GYN Cytology ([facility])    Wet Prep ([facility])    Discussed the harms, benefits, side effects and alternative therapies for current prescribed and OTC medications.    Options for treatment and follow-up care were reviewed with the patient and/or guardian. Tg Calero and/or guardian engaged in the decision making process and verbalized understanding of the options discussed and agreed with the final plan.    Frederick Eddy MD  [facility] Medicine Resident  Pager# [phone]    Precepted with: Rylie Rush MD

## 2017-04-01 LAB
BLD GP AB SCN SERPL QL: NEGATIVE
CULTURE: NORMAL
HBV SURFACE AG SERPL QL IA: NEGATIVE
RPR SER QL: NORMAL

## 2017-04-02 LAB — HBA1C MFR BLD: 5.2 % (ref 4.2–6.1)

## 2017-04-03 LAB
ABO + RH BLD: NORMAL
C TRACH RRNA CVX QL NAA+PROBE: NEGATIVE
N GONORRHOEA RRNA SPEC QL NAA+PROBE: NEGATIVE
REPEAT ABO/RH TYPING (HML): NORMAL
RUBV IGG SERPL QL IA: NORMAL

## 2017-04-06 ENCOUNTER — RECORDS - HEALTHEAST (OUTPATIENT)
Dept: ADMINISTRATIVE | Facility: OTHER | Age: 32
End: 2017-04-06

## 2017-04-06 LAB
HIGH RISK?: NO
HPV REFLEX?: NORMAL
LAB AP ABNORMAL BLEEDING: NO
LAB AP BIRTH CONTROL/HORMONES: NORMAL
LAB AP CASE REPORT: NORMAL
LAB AP CERVICAL APPEARANCE: NORMAL
LAB AP GYN INTERPRETATION: NORMAL
LAB AP MALIGNANT?: NORMAL
LAB AP PATIENT STATUS: NORMAL
LAB AP PREVIOUS ABNL: NO
LAB AP PREVIOUS NORMAL: NORMAL
LAST MENS PERIOD: NORMAL
SPECIMEN ADEQUACY:: NORMAL

## 2017-04-07 ENCOUNTER — TELEPHONE (OUTPATIENT)
Dept: FAMILY MEDICINE | Facility: CLINIC | Age: 32
End: 2017-04-07

## 2017-04-25 ENCOUNTER — TELEPHONE (OUTPATIENT)
Dept: FAMILY MEDICINE | Facility: CLINIC | Age: 32
End: 2017-04-25

## 2017-04-25 NOTE — TELEPHONE ENCOUNTER
Left message for Tg to call clinic. Last seen for bee was 3/31/2017, there is no scheduled next ob appt for April yet. When pt calls back, please schedule a bee with dr Vivian Bernal for her. Thank you. Shanti COYNE

## 2017-04-25 NOTE — TELEPHONE ENCOUNTER
Patient returning Nurse call, gave her info below, she states she needs to come in asap anyway for headaches, got her scheduled for tomorrow 11 am with Dr. Howard.

## 2017-05-18 ENCOUNTER — RECORDS - HEALTHEAST (OUTPATIENT)
Dept: ADMINISTRATIVE | Facility: OTHER | Age: 32
End: 2017-05-18

## 2017-05-18 ENCOUNTER — OFFICE VISIT (OUTPATIENT)
Dept: FAMILY MEDICINE | Facility: CLINIC | Age: 32
End: 2017-05-18

## 2017-05-18 VITALS
OXYGEN SATURATION: 99 % | HEIGHT: 63 IN | WEIGHT: 218 LBS | SYSTOLIC BLOOD PRESSURE: 108 MMHG | TEMPERATURE: 98 F | HEART RATE: 77 BPM | DIASTOLIC BLOOD PRESSURE: 68 MMHG | RESPIRATION RATE: 22 BRPM | BODY MASS INDEX: 38.62 KG/M2

## 2017-05-18 DIAGNOSIS — E28.2 PCOS (POLYCYSTIC OVARIAN SYNDROME): ICD-10-CM

## 2017-05-18 DIAGNOSIS — Z34.82 ENCOUNTER FOR SUPERVISION OF OTHER NORMAL PREGNANCY IN SECOND TRIMESTER: Primary | ICD-10-CM

## 2017-05-18 NOTE — PROGRESS NOTES
"OB Visit    Tg Calero is a 31 year old  female who presents to clinic for a follow up OB visit. She is currently 17w5d with an estimated date of delivery of Oct 21, 2017 via 10wk u/s in setting of irregular periods/PCOS.     She denies headache, chest pain, shortness of breath, abdominal pain/contractions, vaginal bleeding, or abnormal discharge.     She has not felt baby move yet but noticed some flutters.     New concerns today: Tension headaches, using Tylenol about 1-2x/day if needed, tolerating this well. Having some round ligament pains bilaterally. Otherwise doing well. States she missed last appointment and needed to come in to see any doctor since it would take another week or so to see Dr. Wright.     We also talked about her weight (intentional Wt loss- 226 lbs pre pregnancy to 218 today). She is not on any restricting diet, but choosing more healthy foods, increasing fruits/veggies. Watching portion sizes. Eliminated white carbs- choosing whole grain/brown rice. Cut out sugary beverages.     Obstetric History       T2      TAB0   SAB1   E0   M0   L2       # Outcome Date GA Lbr Elpidio/2nd Weight Sex Delivery Anes PTL Lv   4 Current            3 SAB 16           2 Term 10/03/06 42w0d  8 lb 15 oz (4.054 kg) F Vag-Spont   Y      Name: Bianca Day   1 Term 03 40w0d  9 lb (4.082 kg) F    Y      Name: Angy PenaJos          Physical exam:  /68  Pulse 77  Temp 98  F (36.7  C)  Resp 22  Ht 5' 2.5\" (158.8 cm)  Wt 218 lb (98.9 kg)  LMP 2016 (Approximate)  SpO2 99%  BMI 39.24 kg/m2    General: alert, female in no acute distress  Abdomen: gravid uterus appropriate for gestation age above pubic symphysis, non-tender, FHTs 151  Extremities: no edema    Assessment/Plan:    17w5d    1. Encounter for supervision of other normal pregnancy in second trimester  - US OB > 14 Weeks Complete Single  - Fetal anatomy ultrasound planned for 22 weeks.  - We " discussed her intentional weight loss pre-pregnancy (226lbs) and congratulated her on the reasonable approaches she has been doing to maintain weight this pregnancy (218lbs from 10wk to 17wk checks). Recommendation is 11-20lbs weight gain during this pregnancy  - Discussed expectations of second trimester and when to call/come in.  - Continued encouragement of breastfeeding.      2. PCOS (polycystic ovarian syndrome)  - Continues on metformin    Follow up in 4 weeks for routine prenatal visit and fetal survey.    Jessica Howard MD  [facility] Medicine Resident  Pager# [phone]    Precepted with: Adele Varma MD

## 2017-05-18 NOTE — LETTER
June 1, 2017      Tg Calero  [address]  [place] [address]        Dear Tg,    Please see below for your test results.    Your fetal survey ultrasound was normal. I will see you again at your next OB appointment. I look forward to seeing you!    If you have any questions, please call the clinic to make an appointment.    Sincerely,    Frederick Eddy MD

## 2017-05-18 NOTE — PATIENT INSTRUCTIONS
Fetal survey ultrasound order has been faxed to  scheduling [phone]. Pt to call and make appt. Shanti COYNE

## 2017-05-18 NOTE — MR AVS SNAPSHOT
After Visit Summary   5/18/2017    Tg Calero    MRN: [id number]           Patient Information     Date Of Birth          1985        Visit Information        Provider Department      5/18/2017 1:40 PM Jessica Howard MD [facility]        Today's Diagnoses     Encounter for supervision of other normal pregnancy in second trimester    -  1    PCOS (polycystic ovarian syndrome)          Care Instructions    Fetal survey ultrasound order has been faxed to [facility] scheduling [phone]. Pt to call and make appt. RLee RN        Follow-ups after your visit        Your next 10 appointments already scheduled     [place] 15, 2017  2:00 PM CDT   RETURN OB with Jessica Howard MD   [facility] ([facility])    [address] [place] [address]   [phone]              Who to contact     Please call your clinic at [phone] to:    Ask questions about your health    Make or cancel appointments    Discuss your medicines    Learn about your test results    Speak to your doctor   If you have compliments or concerns about an experience at your clinic, or if you wish to file a complaint, please contact [facility] Patient Relations at [phone] or email us at [email or web address]         Additional Information About Your Visit        MyChart Information     [employer] gives you secure access to your electronic health record. If you see a primary care provider, you can also send messages to your care team and make appointments. If you have questions, please call your primary care clinic.  If you do not have a primary care provider, please call [phone] and they will assist you.      [employer] is an electronic gateway that provides easy, online access to your medical records. With [employer], you can request a clinic appointment, read your test results, renew a prescription or communicate with your care team.     To access  "your existing account, please contact your [facility] or call [phone] for assistance.        Care EveryWhere ID     This is your Care EveryWhere ID. This could be used by other organizations to access your [place] medical records  [id number]        Your Vitals Were     Pulse Temperature Respirations Height Last Period Pulse Oximetry    77 98  F (36.7  C) 22 5' 2.5\" (158.8 cm) 12/02/2016 (Approximate) 99%    BMI (Body Mass Index)                   39.24 kg/m2            Blood Pressure from Last 3 Encounters:   05/18/17 108/68   03/31/17 100/65   03/24/17 115/70    Weight from Last 3 Encounters:   05/18/17 218 lb (98.9 kg)   03/31/17 218 lb 6.4 oz (99.1 kg)   03/24/17 223 lb (101.2 kg)              We Performed the Following     US OB > 14 Weeks Complete Single        Primary Care Provider Office Phone # Fax #    Frederick Eddy [phone]       [facility] [address] [place] [address]        Thank you!     Thank you for choosing [facility]  for your care. Our goal is always to provide you with excellent care. Hearing back from our patients is one way we can continue to improve our services. Please take a few minutes to complete the written survey that you may receive in the mail after your visit with us. Thank you!             Your Updated Medication List - Protect others around you: Learn how to safely use, store and throw away your medicines at www.disposemymeds.org.          This list is accurate as of: 5/18/17 11:59 PM.  Always use your most recent med list.                   Brand Name Dispense Instructions for use    albuterol 108 (90 BASE) MCG/ACT Inhaler    PROAIR HFA/PROVENTIL HFA/VENTOLIN HFA    3 Inhaler    Inhale 2 puffs into the lungs every 6 hours as needed for shortness of breath / dyspnea       fluticasone 110 MCG/ACT Inhaler    FLOVENT HFA    3 Inhaler    Inhale 2 puffs into the lungs 2 times daily 2 puffs    "    fluticasone 50 MCG/ACT spray    FLONASE    16 g    Spray 1-2 sprays into both nostrils daily       ketotifen 0.025 % Soln ophthalmic solution    ZADITOR    1 Bottle    Place 1 drop into both eyes every 12 hours       metFORMIN 500 MG tablet    GLUCOPHAGE    180 tablet    Take 1 tablet (500 mg) by mouth 2 times daily (with meals)       montelukast 10 MG tablet    SINGULAIR    90 tablet    Take 1 tablet (10 mg) by mouth At Bedtime       order for DME     1 each    Equipment being ordered: carpal tunnel brace right wrist       prenatal multivitamin  plus iron 27-0.8 MG Tabs per tablet     100 tablet    Take 1 tablet by mouth daily

## 2017-05-21 NOTE — PROGRESS NOTES
Preceptor Attestation:  Patient's case reviewed and discussed with Jessica Howard MD.  Patient seen and discussed with the resident.  I agree with assessment and plan of care.  Supervising Physician:  Adele Varma MD  [facility]

## 2017-05-30 ENCOUNTER — HOSPITAL ENCOUNTER (OUTPATIENT)
Dept: ULTRASOUND IMAGING | Facility: HOSPITAL | Age: 32
Discharge: HOME | End: 2017-05-30

## 2017-05-30 DIAGNOSIS — Z34.82 ENCOUNTER FOR SUPERVISION OF OTHER NORMAL PREGNANCY, SECOND TRIMESTER: ICD-10-CM

## 2017-05-30 DIAGNOSIS — E28.2 PCOS (POLYCYSTIC OVARIAN SYNDROME): ICD-10-CM

## 2017-06-01 ENCOUNTER — TELEPHONE (OUTPATIENT)
Dept: FAMILY MEDICINE | Facility: CLINIC | Age: 32
End: 2017-06-01

## 2017-06-12 ENCOUNTER — OFFICE VISIT (OUTPATIENT)
Dept: FAMILY MEDICINE | Facility: CLINIC | Age: 32
End: 2017-06-12

## 2017-06-12 VITALS
DIASTOLIC BLOOD PRESSURE: 63 MMHG | WEIGHT: 221.2 LBS | HEIGHT: 62 IN | BODY MASS INDEX: 40.7 KG/M2 | TEMPERATURE: 97.3 F | OXYGEN SATURATION: 98 % | HEART RATE: 81 BPM | SYSTOLIC BLOOD PRESSURE: 100 MMHG

## 2017-06-12 DIAGNOSIS — N89.8 VAGINAL DISCHARGE DURING PREGNANCY IN SECOND TRIMESTER: Primary | ICD-10-CM

## 2017-06-12 DIAGNOSIS — O26.892 VAGINAL DISCHARGE DURING PREGNANCY IN SECOND TRIMESTER: Primary | ICD-10-CM

## 2017-06-12 LAB
BACTERIA: NORMAL
CLUE CELLS: NORMAL
MOTILE TRICHOMONAS: NEGATIVE
ODOR: NORMAL
PH WET PREP: NORMAL
WBC WET PREP: <2
YEAST: NEGATIVE

## 2017-06-12 NOTE — MR AVS SNAPSHOT
After Visit Summary   6/12/2017    Tg Calero    MRN: [id number]           Patient Information     Date Of Birth          1985        Visit Information        Provider Department      6/12/2017 3:40 PM Oral Jalloh MD [facility]        Today's Diagnoses     Vaginal discharge during pregnancy in second trimester    -  1       Follow-ups after your visit        Your next 10 appointments already scheduled     [place] 15, 2017  2:00 PM CDT   RETURN OB with Jessica Howard MD   [facility] ([facility])    [address] [place] [address]   [phone]              Who to contact     Please call your clinic at [phone] to:    Ask questions about your health    Make or cancel appointments    Discuss your medicines    Learn about your test results    Speak to your doctor   If you have compliments or concerns about an experience at your clinic, or if you wish to file a complaint, please contact [facility] Patient Relations at [phone] or email us at [email or web address]         Additional Information About Your Visit        MyChart Information     [employer] gives you secure access to your electronic health record. If you see a primary care provider, you can also send messages to your care team and make appointments. If you have questions, please call your primary care clinic.  If you do not have a primary care provider, please call [phone] and they will assist you.      [employer] is an electronic gateway that provides easy, online access to your medical records. With [employer], you can request a clinic appointment, read your test results, renew a prescription or communicate with your care team.     To access your existing account, please contact your [facility] or call [phone] for assistance.        Care EveryWhere ID     This is your Care EveryWhere ID. This could be  "used by other organizations to access your [place] medical records  [id number]        Your Vitals Were     Pulse Temperature Height Last Period Pulse Oximetry BMI (Body Mass Index)    81 97.3  F (36.3  C) (Oral) 5' 2.25\" (158.1 cm) 12/02/2016 (Approximate) 98% 40.13 kg/m2       Blood Pressure from Last 3 Encounters:   06/12/17 100/63   05/18/17 108/68   03/31/17 100/65    Weight from Last 3 Encounters:   06/12/17 221 lb 3.2 oz (100.3 kg)   05/18/17 218 lb (98.9 kg)   03/31/17 218 lb 6.4 oz (99.1 kg)              We Performed the Following     Wet Prep ([facility])        Primary Care Provider Office Phone # Fax #    Frederick Eddy [phone]       [facility] [address] [place] [address]        Thank you!     Thank you for choosing [facility]  for your care. Our goal is always to provide you with excellent care. Hearing back from our patients is one way we can continue to improve our services. Please take a few minutes to complete the written survey that you may receive in the mail after your visit with us. Thank you!             Your Updated Medication List - Protect others around you: Learn how to safely use, store and throw away your medicines at www.disposemymeds.org.          This list is accurate as of: 6/12/17  5:04 PM.  Always use your most recent med list.                   Brand Name Dispense Instructions for use    albuterol 108 (90 BASE) MCG/ACT Inhaler    PROAIR HFA/PROVENTIL HFA/VENTOLIN HFA    3 Inhaler    Inhale 2 puffs into the lungs every 6 hours as needed for shortness of breath / dyspnea       fluticasone 110 MCG/ACT Inhaler    FLOVENT HFA    3 Inhaler    Inhale 2 puffs into the lungs 2 times daily 2 puffs       fluticasone 50 MCG/ACT spray    FLONASE    16 g    Spray 1-2 sprays into both nostrils daily       ketotifen 0.025 % Soln ophthalmic solution    ZADITOR    1 Bottle    Place 1 drop into both eyes every 12 hours       metFORMIN 500 MG " tablet    GLUCOPHAGE    180 tablet    Take 1 tablet (500 mg) by mouth 2 times daily (with meals)       montelukast 10 MG tablet    SINGULAIR    90 tablet    Take 1 tablet (10 mg) by mouth At Bedtime       order for DME     1 each    Equipment being ordered: carpal tunnel brace right wrist       prenatal multivitamin  plus iron 27-0.8 MG Tabs per tablet     100 tablet    Take 1 tablet by mouth daily

## 2017-06-12 NOTE — PROGRESS NOTES
" [facility]        Subjective   HPI: Tg Calero is a 31 year old female  who presents as an established patient for the following:    Pregnant at 21w2d. +fetal movement.     Vaginal symptoms: reports a \"weird\" odor for the past three days. She notes some associated clear vaginal discharge as well, but relatively minimal in amount - only notices it with wiping. It is not itching. She does note that her labia majora seem to be irritated and that any movement seems to exacacerbate this.     She did have an episode of spotting once after intercourse about two weeks ago which resolved. No cramping.     No new sexual partners.    ROS: constitutional, cardiovascular, respiratory, GI, , MSK systems reviewed and negative except as noted above.    Social:  Former smoker.          Objective   /63  Pulse 81  Temp 97.3  F (36.3  C) (Oral)  Ht 5' 2.25\" (158.1 cm)  Wt 221 lb 3.2 oz (100.3 kg)  LMP 12/02/2016 (Approximate)  SpO2 98%  BMI 40.13 kg/m2 Body mass index is 40.13 kg/(m^2).  Gen: healthy, alert and no distress,  Pelvic: external genitalia notable for mild erythema and lichenification of the labia majora. Mucosa pink. No abnormal discharge. cervix without abnormality. Uterus normal size. Chaperoned by MA for this portion of exam. Wet prep collected.           Assessment & Plan     1. Vaginal discharge during pregnancy in second trimester  Wet prep normal. Exam consistent with irritation of the labia majora likely due to clothing, warm weather. Given handout on vaginal/vulvar hygeine. Low suspicion for PPROM.  F/U as scheduled for OB visit later this week.  - Wet Prep ([facility])    Precepted today with: MD Oral Alas MD    -------  PMH:  Patient Active Problem List   Diagnosis     Allergic rhinitis due to animal dander     Irregular menses     PCOS (polycystic ovarian syndrome)     Mild persistent asthma     Vitamin D deficiency disease     Female infertility     " Pap smear for cervical cancer screening     Supervision of pregnancy with history of infertility     Obesity (BMI 35.0-39.9 without comorbidity) (H)      Current Outpatient Prescriptions   Medication     Prenatal Vit-Fe Fumarate-FA (PRENATAL MULTIVITAMIN  PLUS IRON) 27-0.8 MG TABS per tablet     fluticasone (FLOVENT HFA) 110 MCG/ACT Inhaler     fluticasone (FLONASE) 50 MCG/ACT spray     montelukast (SINGULAIR) 10 MG tablet     albuterol (PROAIR HFA/PROVENTIL HFA/VENTOLIN HFA) 108 (90 BASE) MCG/ACT Inhaler     order for DME     ketotifen (ZADITOR) 0.025 % SOLN ophthalmic solution     metFORMIN (GLUCOPHAGE) 500 MG tablet     No current facility-administered medications for this visit.       ALLERGIES: Animal dander and Seasonal allergies

## 2017-06-12 NOTE — PROGRESS NOTES
Preceptor Attestation:  Patient's case reviewed and discussed with Oral Jalloh MD Patient seen and discussed with the resident.. I agree with assessment and plan of care.  Supervising Physician:  Phil Howard MD  [facility]

## 2017-06-19 DIAGNOSIS — E28.2 PCOS (POLYCYSTIC OVARIAN SYNDROME): ICD-10-CM

## 2017-06-20 ENCOUNTER — OFFICE VISIT (OUTPATIENT)
Dept: FAMILY MEDICINE | Facility: CLINIC | Age: 32
End: 2017-06-20

## 2017-06-20 DIAGNOSIS — Z34.92 PRENATAL CARE, SECOND TRIMESTER: Primary | ICD-10-CM

## 2017-06-20 NOTE — PATIENT INSTRUCTIONS
[facility] Information  If you have any further concerns or wish to schedule another appointment, please call our office at [phone] during normal business hours (8-5, M-F).     For uncomplicated pregnancies, you will be seeing your doctor once a month until you are 28 weeks, then you will see your doctor twice a month. You will begin weekly visits at 36 weeks until you deliver.     If you have urgent medical questions that cannot wait, you may call [phone] at any time of day.     If you have a medical emergency, please call [phone].    When to call or come in to the hospital  If you notice a decrease in your baby's movement.   If your begin to experience contractions that are 5 minutes or less apart and lasting for over 45 seconds, or if contractions are becoming more painful.  If you have any bleeding or leakage of fluids.   If you have a headache not resolved with tylenol, right upper abdominal pain, or sudden onset of swelling.  You know your body best. Never hesitate to call or go to the hospital if something doesn't feel right!  Gestational Diabetes Screen  At your next visit, you will be screened for gestational diabetes. You will drink a sugary drink and then have your blood drawn to see how your body responds to a sugar load. This test takes about an hour to complete - please plan your schedule accordingly!  The Benefits of Breastfeeding   Breastfeeding gives your new baby the very best start. It supplies nutrition, comfort, and love. Experts agree: Breastfeeding is the healthiest choice for babies during the first year of life and beyond. It s healthy for Mom, too. Breastfeeding may be challenging at first. But with support, you and your baby can succeed together.   Healthiest for Baby   Breastmilk is the ideal food for babies. It has all the nutrients your little one needs to grow healthy and strong. Here are some of the many benefits for your baby:   Breastfeeding provides contact that  babies love. Frequent skin-to-skin time with Mom is calming and comforting.   Breastmilk is full of antibodies. These are substances that help your baby fight infection. Breastmilk reduces your baby's risk of respiratory illnesses, ear infections, and diarrhea.   Breastfeeding reduces your baby s risk of allergies, colds, and many other diseases.   Breastmilk changes as your baby grows, meeting her changing needs.   Breastmilk is easy for your baby to digest.   Breastmilk contains DHA, a fat that is good for your baby s developing brain and eyes.   Breastmilk decreases the risk of sudden infant death syndrome (SIDS).  Healthiest for Mom   For many women, breastfeeding is an amazing experience. It creates a strong bond between mother and baby. Other benefits for Mom include:   You can feel proud knowing that your baby is growing healthy and strong because of your milk.   Breastmilk is convenient. It s free, clean, and always the right temperature.   Breastfeeding burns calories. This can help you lose pregnancy weight faster.   Breastfeeding releases hormones that contract the uterus. This helps the uterus return to its normal size after childbirth.   Mothers who breastfeed have a decreased risk of ovarian and breast cancers.  There are many people who can help you learn to breastfeed. A lactation consultant is a healthcare provider specially trained to help breastfeeding moms. A lactation consultant will visit you after delivery and is available after your baby is born - if you'd like to meet with lactation prior to delivery, let your doctor know!

## 2017-06-20 NOTE — PROGRESS NOTES
Tg Calero is a 31 year old  female who presents to clinic for a follow up OB visit. She is currently 22w3d with an estimated date of delivery of Oct 21, 2017 via vaginal delivery.     She denies headache, chest pain, shortness of breath, abdominal pain/contractions, vaginal bleeding, or abnormal discharge.     She has felt baby move. Continues to have round ligament pain.    New concerns today: None    Obstetric History       T2      L2     SAB0   TAB0   Ectopic0   Multiple0   Live Births0       # Outcome Date GA Lbr Elpidio/2nd Weight Sex Delivery Anes PTL Lv   4 Current            3 SAB 16           2 Term 10/03/06 42w0d  8 lb 15 oz (4.054 kg) F Vag-Spont   CALEB      Name: Bianca Day   1 Term 03 40w0d  9 lb (4.082 kg) F    CALEB      Name: Angy Arguello          Physical exam:  LMP 2016 (Approximate)    General: alert, female in no acute distress  Abdomen: gravid uterus appropriate for gestation age at 23 cm above pubic symphysis, non-tender, FHTs 154  Extremities: no edema    Assessment/Plan:  Patient Active Problem List   Diagnosis     Allergic rhinitis due to animal dander     Irregular menses     PCOS (polycystic ovarian syndrome)     Mild persistent asthma     Vitamin D deficiency disease     Female infertility     Pap smear for cervical cancer screening     Supervision of pregnancy with history of infertility     Obesity (BMI 35.0-39.9 without comorbidity) (H)     - Fetal survey ultrasound done on 17 and normal.  - Baby is a girl.  - Discussed pre-term labor signs and symptoms and when to call/come in.   - Continued encouragement of breastfeeding.  - Discussed expectations for gestational diabetes screen to be completed at next visit.    Follow up in 4 weeks for routine prenatal visit.     Frederick Eddy MD  [facility] Medicine Resident  Pager# [phone]    Precepted with: Dr. Varma

## 2017-06-20 NOTE — NURSING NOTE
SHANTE: 10/21/17  22w3d    DUE FOR:  ACT given  OB Basket   Dopplerss    The most recent recommendation is to provide Tdap in every pregnancy, ideally at 27-36 weeks gestation

## 2017-06-20 NOTE — MR AVS SNAPSHOT
After Visit Summary   6/20/2017    Tg Calero    MRN: [id number]           Patient Information     Date Of Birth          1985        Visit Information        Provider Department      6/20/2017 4:00 PM Frederick Eddy MD [facility]        Care Instructions    [facility] Information  If you have any further concerns or wish to schedule another appointment, please call our office at [phone] during normal business hours (8-5, M-F).     For uncomplicated pregnancies, you will be seeing your doctor once a month until you are 28 weeks, then you will see your doctor twice a month. You will begin weekly visits at 36 weeks until you deliver.     If you have urgent medical questions that cannot wait, you may call [phone] at any time of day.     If you have a medical emergency, please call [phone].    When to call or come in to the hospital  If you notice a decrease in your baby's movement.   If your begin to experience contractions that are 5 minutes or less apart and lasting for over 45 seconds, or if contractions are becoming more painful.  If you have any bleeding or leakage of fluids.   If you have a headache not resolved with tylenol, right upper abdominal pain, or sudden onset of swelling.  You know your body best. Never hesitate to call or go to the hospital if something doesn't feel right!  Gestational Diabetes Screen  At your next visit, you will be screened for gestational diabetes. You will drink a sugary drink and then have your blood drawn to see how your body responds to a sugar load. This test takes about an hour to complete - please plan your schedule accordingly!  The Benefits of Breastfeeding   Breastfeeding gives your new baby the very best start. It supplies nutrition, comfort, and love. Experts agree: Breastfeeding is the healthiest choice for babies during the first year of life and beyond. It s healthy for Mom, too. Breastfeeding may be  challenging at first. But with support, you and your baby can succeed together.   Healthiest for Baby   Breastmilk is the ideal food for babies. It has all the nutrients your little one needs to grow healthy and strong. Here are some of the many benefits for your baby:   Breastfeeding provides contact that babies love. Frequent skin-to-skin time with Mom is calming and comforting.   Breastmilk is full of antibodies. These are substances that help your baby fight infection. Breastmilk reduces your baby's risk of respiratory illnesses, ear infections, and diarrhea.   Breastfeeding reduces your baby s risk of allergies, colds, and many other diseases.   Breastmilk changes as your baby grows, meeting her changing needs.   Breastmilk is easy for your baby to digest.   Breastmilk contains DHA, a fat that is good for your baby s developing brain and eyes.   Breastmilk decreases the risk of sudden infant death syndrome (SIDS).  Healthiest for Mom   For many women, breastfeeding is an amazing experience. It creates a strong bond between mother and baby. Other benefits for Mom include:   You can feel proud knowing that your baby is growing healthy and strong because of your milk.   Breastmilk is convenient. It s free, clean, and always the right temperature.   Breastfeeding burns calories. This can help you lose pregnancy weight faster.   Breastfeeding releases hormones that contract the uterus. This helps the uterus return to its normal size after childbirth.   Mothers who breastfeed have a decreased risk of ovarian and breast cancers.  There are many people who can help you learn to breastfeed. A lactation consultant is a healthcare provider specially trained to help breastfeeding moms. A lactation consultant will visit you after delivery and is available after your baby is born - if you'd like to meet with lactation prior to delivery, let your doctor know!                 Follow-ups after your visit        Follow-up notes  from your care team     Return in about 4 weeks (around 7/18/2017) for Return OB 26 weeks.      Who to contact     Please call your clinic at [phone] to:    Ask questions about your health    Make or cancel appointments    Discuss your medicines    Learn about your test results    Speak to your doctor   If you have compliments or concerns about an experience at your clinic, or if you wish to file a complaint, please contact [facility] Patient Relations at [phone] or email us at [email or web address]         Additional Information About Your Visit        [employer] Information     Toywheel gives you secure access to your electronic health record. If you see a primary care provider, you can also send messages to your care team and make appointments. If you have questions, please call your primary care clinic.  If you do not have a primary care provider, please call [phone] and they will assist you.      Toywheel is an electronic gateway that provides easy, online access to your medical records. With [employer]wheel, you can request a clinic appointment, read your test results, renew a prescription or communicate with your care team.     To access your existing account, please contact your [facility] or call [phone] for assistance.        Care EveryWhere ID     This is your Care EveryWhere ID. This could be used by other organizations to access your [place] medical records  [id number]        Your Vitals Were     Last Period                   12/02/2016 (Approximate)            Blood Pressure from Last 3 Encounters:   06/12/17 100/63   05/18/17 108/68   03/31/17 100/65    Weight from Last 3 Encounters:   06/12/17 221 lb 3.2 oz (100.3 kg)   05/18/17 218 lb (98.9 kg)   03/31/17 218 lb 6.4 oz (99.1 kg)              Today, you had the following     No orders found for display       Primary Care Provider Office Phone # Fax #    Frederick CORONA  MD Nunu [phone]       [facility] [address] [place] [address]        Thank you!     Thank you for choosing [facility]  for your care. Our goal is always to provide you with excellent care. Hearing back from our patients is one way we can continue to improve our services. Please take a few minutes to complete the written survey that you may receive in the mail after your visit with us. Thank you!             Your Updated Medication List - Protect others around you: Learn how to safely use, store and throw away your medicines at www.disposemymeds.org.          This list is accurate as of: 6/20/17  4:56 PM.  Always use your most recent med list.                   Brand Name Dispense Instructions for use    albuterol 108 (90 BASE) MCG/ACT Inhaler    PROAIR HFA/PROVENTIL HFA/VENTOLIN HFA    3 Inhaler    Inhale 2 puffs into the lungs every 6 hours as needed for shortness of breath / dyspnea       fluticasone 110 MCG/ACT Inhaler    FLOVENT HFA    3 Inhaler    Inhale 2 puffs into the lungs 2 times daily 2 puffs       fluticasone 50 MCG/ACT spray    FLONASE    16 g    Spray 1-2 sprays into both nostrils daily       ketotifen 0.025 % Soln ophthalmic solution    ZADITOR    1 Bottle    Place 1 drop into both eyes every 12 hours       metFORMIN 500 MG tablet    GLUCOPHAGE    180 tablet    Take 1 tablet (500 mg) by mouth 2 times daily (with meals)       montelukast 10 MG tablet    SINGULAIR    90 tablet    Take 1 tablet (10 mg) by mouth At Bedtime       order for DME     1 each    Equipment being ordered: carpal tunnel brace right wrist       prenatal multivitamin  plus iron 27-0.8 MG Tabs per tablet     100 tablet    Take 1 tablet by mouth daily

## 2017-06-22 ASSESSMENT — ASTHMA QUESTIONNAIRES: ACT_TOTALSCORE: 24

## 2017-06-26 NOTE — PROGRESS NOTES
Preceptor Attestation:  Patient's case reviewed and discussed with Frederick Eddy MD.   Patient seen and discussed with the resident.  I agree with assessment and plan of care.  Supervising Physician:  Adele Varma MD  [facility]

## 2017-07-06 ENCOUNTER — MEDICAL CORRESPONDENCE (OUTPATIENT)
Dept: HEALTH INFORMATION MANAGEMENT | Facility: CLINIC | Age: 32
End: 2017-07-06

## 2017-07-13 ENCOUNTER — COMMUNICATION - HEALTHEAST (OUTPATIENT)
Dept: SCHEDULING | Facility: CLINIC | Age: 32
End: 2017-07-13

## 2017-07-13 ENCOUNTER — HOSPITAL ENCOUNTER (OUTPATIENT)
Dept: OBGYN | Facility: HOSPITAL | Age: 32
Discharge: HOME | End: 2017-07-13
Attending: FAMILY MEDICINE | Admitting: FAMILY MEDICINE

## 2017-07-17 ENCOUNTER — TELEPHONE (OUTPATIENT)
Dept: FAMILY MEDICINE | Facility: CLINIC | Age: 32
End: 2017-07-17

## 2017-07-17 NOTE — TELEPHONE ENCOUNTER
Tg will be due for a routine ob visit this week to next week. Would also like to remind Tg at this visit she will be completing her 1 hr glucose challenge test. Left message for Tg to call me back-when she calls back please schedule her a bee appt and remind her at this visit she may need to plan for a longer visit at clinic due to completing one hour gct.  Thank you.  Shanti COYNE

## 2017-07-19 ENCOUNTER — OFFICE VISIT (OUTPATIENT)
Dept: FAMILY MEDICINE | Facility: CLINIC | Age: 32
End: 2017-07-19

## 2017-07-19 VITALS
BODY MASS INDEX: 40.48 KG/M2 | TEMPERATURE: 98.7 F | HEART RATE: 87 BPM | WEIGHT: 220 LBS | DIASTOLIC BLOOD PRESSURE: 66 MMHG | HEIGHT: 62 IN | RESPIRATION RATE: 16 BRPM | OXYGEN SATURATION: 98 % | SYSTOLIC BLOOD PRESSURE: 115 MMHG

## 2017-07-19 DIAGNOSIS — O09.00 SUPERVISION OF PREGNANCY WITH HISTORY OF INFERTILITY: Primary | ICD-10-CM

## 2017-07-19 LAB — GLU GEST SCREEN 1HR 50G: 160 (ref 0–129)

## 2017-07-19 NOTE — MR AVS SNAPSHOT
After Visit Summary   7/19/2017    Tg Calero    MRN: [id number]           Patient Information     Date Of Birth          1985        Visit Information        Provider Department      7/19/2017 4:00 PM Maykel Redman MD [facility]        Today's Diagnoses     Supervision of pregnancy with history of infertility    -  1       Follow-ups after your visit        Follow-up notes from your care team     Return in about 4 weeks (around 8/16/2017) for MARK.      Who to contact     Please call your clinic at [phone] to:    Ask questions about your health    Make or cancel appointments    Discuss your medicines    Learn about your test results    Speak to your doctor   If you have compliments or concerns about an experience at your clinic, or if you wish to file a complaint, please contact [facility] Patient Relations at [phone] or email us at [email or web address]         Additional Information About Your Visit        MyChart Information     [employer] gives you secure access to your electronic health record. If you see a primary care provider, you can also send messages to your care team and make appointments. If you have questions, please call your primary care clinic.  If you do not have a primary care provider, please call [phone] and they will assist you.      [email or web address] is an electronic gateway that provides easy, online access to your medical records. With [email or web address], you can request a clinic appointment, read your test results, renew a prescription or communicate with your care team.     To access your existing account, please contact your [facility] or call [phone] for assistance.        Care EveryWhere ID     This is your Care EveryWhere ID. This could be used by other organizations to access your [place] medical records  [id number]        Your Vitals Were     Pulse Temperature  "Respirations Height Last Period Pulse Oximetry    87 98.7  F (37.1  C) (Oral) 16 5' 1.81\" (157 cm) 12/02/2016 (Approximate) 98%    BMI (Body Mass Index)                   40.48 kg/m2            Blood Pressure from Last 3 Encounters:   07/19/17 115/66   06/12/17 100/63   05/18/17 108/68    Weight from Last 3 Encounters:   07/19/17 220 lb (99.8 kg)   06/12/17 221 lb 3.2 oz (100.3 kg)   05/18/17 218 lb (98.9 kg)              Today, you had the following     No orders found for display       Primary Care Provider Office Phone # Fax #    Frederick Eddy [phone]       [facility] [address] [place] [address]        Equal Access to Services     [facility]: Hadii aad ivana hadasho Soomaali, waaxda luqadaha, qaybta kaalmada adeegyada, waxay myrnain haysherice dickinson . So [facility] [phone].    ATENCIÓN: Si habla español, tiene a mann disposición servicios gratuitos de asistencia lingüística. Llame al [phone].    We comply with applicable federal civil rights laws and [place] laws. We do not discriminate on the basis of race, color, national origin, age, disability sex, sexual orientation or gender identity.            Thank you!     Thank you for choosing [facility]  for your care. Our goal is always to provide you with excellent care. Hearing back from our patients is one way we can continue to improve our services. Please take a few minutes to complete the written survey that you may receive in the mail after your visit with us. Thank you!             Your Updated Medication List - Protect others around you: Learn how to safely use, store and throw away your medicines at www.disposemymeds.org.          This list is accurate as of: 7/19/17  4:49 PM.  Always use your most recent med list.                   Brand Name Dispense Instructions for use Diagnosis    albuterol 108 (90 BASE) MCG/ACT Inhaler    PROAIR HFA/PROVENTIL HFA/VENTOLIN HFA    3 Inhaler    " Inhale 2 puffs into the lungs every 6 hours as needed for shortness of breath / dyspnea    Mild persistent asthma without complication       fluticasone 110 MCG/ACT Inhaler    FLOVENT HFA    3 Inhaler    Inhale 2 puffs into the lungs 2 times daily 2 puffs    Mild persistent asthma without complication       fluticasone 50 MCG/ACT spray    FLONASE    16 g    Spray 1-2 sprays into both nostrils daily    Chronic rhinitis       ketotifen 0.025 % Soln ophthalmic solution    ZADITOR    1 Bottle    Place 1 drop into both eyes every 12 hours    Seasonal allergic conjunctivitis       metFORMIN 500 MG tablet    GLUCOPHAGE    180 tablet    Take 1 tablet (500 mg) by mouth 2 times daily (with meals)    PCOS (polycystic ovarian syndrome)       montelukast 10 MG tablet    SINGULAIR    90 tablet    Take 1 tablet (10 mg) by mouth At Bedtime    Mild persistent asthma without complication, Chronic rhinitis       order for DME     1 each    Equipment being ordered: carpal tunnel brace right wrist    Tendinitis of extensor tendon of right hand       prenatal multivitamin  plus iron 27-0.8 MG Tabs per tablet     100 tablet    Take 1 tablet by mouth daily    Pregnancy test positive

## 2017-07-19 NOTE — PROGRESS NOTES
The patient is a 31 year old    presenting at 26w4d  for follow up ob.  She  denies any HA, CP, SOB, abd pain/contractions, vaginal bleeding or abnormal discharge. Baby has been moving normally.  Her new concerns today include none.   at [facility], starting have some low back pain.    Exam: Cardiac: RRR no m/r/g  Lungs: CTAB no r/c/w  Abd: gravid uterus appropriate for gestational age, non-tender.  Neuro: reflexes normal symmetric at the patella.    A/P  1) Pregnancy-   - COntinue with routine prenatal care  - RTC in 4 weeks with Dr. Park  - Glucose challenge    Precepted with: Dr. Ancelmo Redman MD

## 2017-07-19 NOTE — NURSING NOTE
26w4d    DUE FOR:  ACT last on 6/21/17 score of 24  OB Basket   Doppler  The most recent recommendation is to provide Tdap in every pregnancy, ideally at 27-36 weeks gestation

## 2017-07-19 NOTE — LETTER
RETURN TO WORK/SCHOOL FORM    7/19/2017    Re: Tg Calero  1985      To Whom It May Concern:     Tg Calero was seen in clinic today..  She may return to work without restrictions on 7/20/17. Please allow for sitting breaks during her work shifts.  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  These breaks should occur for 20-30 minutes every hour.  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       Restrictions:  See above for explanation      Maykel Redman MD  7/19/2017 4:41 PM

## 2017-07-19 NOTE — PROGRESS NOTES
Preceptor Attestation:  Patient's case reviewed and discussed with Maykel Redman MD resident and I evaluated the patient. I agree with written assessment and plan of care.  Supervising Physician:  Christie Ag   [facility]

## 2017-07-19 NOTE — LETTER
July 21, 2017      Tg Calero  [address]  [place] [address]        Dear Tg,    This is a copy of your Asthma Action Plan.  Keep this for your record.    If you have any questions, please give me at call at [phone].    My Asthma Action Plan  Name: Tg Calero  YOB: 1985  Date: 7/21/2017   My doctor: Frederick Eddy   My clinic:   [facility] [address] [place] [address]  [phone]  My Asthma Severity: mild persistent Avoid your asthma triggers: smoke, pollens, humidity and exercise or sports       GREEN ZONE Good Control    I feel good    No cough or wheeze    Can work, sleep and play without asthma symptoms       Take your asthma control medicine every day.  Take the medications listed below daily.     Singulair  Tablet 10 mg once daily     1. If exercise triggers your asthma, take your rescue medication (2 puffs of albuterol, Ventolin/Pro-Air) 15 minutes before exercise or sports, and during exercise if you have asthma symptoms.  2. Spacer to use with inhaler: If you have a spacer, make sure to use it with your inhaler.                  YELLOW ZONE Getting Worse  I have ANY of these:    I do not feel good    Cough or wheeze    Chest feels tight    Wake up at night    1. Keep taking your Green Zone medications.  2. Start taking your rescue medicine (1-2 puffs of albuterol - Ventolin/Pro-Air) every 4-6 hours as needed.  3. If symptoms are not controlled with above, can take 2 puffs every 20 minutes for up to 1 hour, then continue every 4 hours if needed.   4. If you do not return to the Green Zone in 12-24 hours or you get worse, call the clinic.          RED ZONE Medical Alert - Get Help  I have ANY of these:    I feel awful    Medicine is not helping    Breathing getting harder    Trouble walking or talking    Nose opens wide to breathe       1. Take your rescue medicine NOW (6-8 puffs of albuterol - Ventolin/Pro-Air) for every 20  minutes for up to 1 hour.  2. If your provider has prescribed an oral steroid medicine (), start taking it NOW.  3. Call your doctor NOW.  4. If you are still in the Red Zone after 20 minutes and you have not reached your doctor:    Take your rescue medicine again (6-8 puffs of albuterol - Ventolin/Pro-Air) and    Call 911 or go to the emergency room right away     See your regular doctor within 1 weeks of an Emergency Room or Urgent Care visit for follow-up treatment.      This Asthma Action Plan provides authorization for the administration of medication described in the AAP.  YES  This child has the knowledge and skills to self-administer rescue medication at school or  with approval of the school nurse.  YES     Electronically signed by: Maykel Redman      Sincerely,    Maykel Redman MD

## 2017-07-21 ASSESSMENT — ASTHMA QUESTIONNAIRES: ACT_TOTALSCORE: 25

## 2017-07-21 NOTE — PATIENT INSTRUCTIONS
My Asthma Action Plan  Name: Tg Calero  YOB: 1985  Date: 7/21/2017   My doctor: Frederick Eddy   My clinic:   [facility] [address] [place] [address]  [phone]    My Asthma Severity: mild persistent Avoid your asthma triggers: smoke, pollens, humidity and exercise or sports      GREEN ZONE   Good Control    I feel good    No cough or wheeze    Can work, sleep and play without asthma symptoms       Take your asthma control medicine every day.  Take the medications listed below daily.    Singulair  Tablet 10 mg once daily    1. If exercise triggers your asthma, take your rescue medication (2 puffs of albuterol, Ventolin/Pro-Air) 15 minutes before exercise or sports, and during exercise if you have asthma symptoms.  2. Spacer to use with inhaler: If you have a spacer, make sure to use it with your inhaler.              YELLOW ZONE Getting Worse  I have ANY of these:    I do not feel good    Cough or wheeze    Chest feels tight    Wake up at night   1. Keep taking your Green Zone medications.  2. Start taking your rescue medicine (1-2 puffs of albuterol - Ventolin/Pro-Air) every 4-6 hours as needed.  3. If symptoms are not controlled with above, can take 2 puffs every 20 minutes for up to 1 hour, then continue every 4 hours if needed.   4. If you do not return to the Green Zone in 12-24 hours or you get worse, call the clinic.         RED ZONE Medical Alert - Get Help  I have ANY of these:    I feel awful    Medicine is not helping    Breathing getting harder    Trouble walking or talking    Nose opens wide to breathe       1. Take your rescue medicine NOW (6-8 puffs of albuterol - Ventolin/Pro-Air) for every 20 minutes for up to 1 hour.  2. If your provider has prescribed an oral steroid medicine (), start taking it NOW.  3. Call your doctor NOW.  4. If you are still in the Red Zone after 20 minutes and you have not reached your doctor:    Take your rescue  medicine again (6-8 puffs of albuterol - Ventolin/Pro-Air) and    Call 911 or go to the emergency room right away    See your regular doctor within 1 weeks of an Emergency Room or Urgent Care visit for follow-up treatment.        This Asthma Action Plan provides authorization for the administration of medication described in the AAP.  YES  This child has the knowledge and skills to self-administer rescue medication at school or  with approval of the school nurse.  YES    Electronically signed by: Maykel Redman    Annual Reminders:  Meet with Asthma Educator,  Flu Shot in the Fall, Pneumonia Shot  [id number] - [place], [place] - [address] AT [facility]

## 2017-07-24 ENCOUNTER — TELEPHONE (OUTPATIENT)
Dept: FAMILY MEDICINE | Facility: CLINIC | Age: 32
End: 2017-07-24

## 2017-07-24 DIAGNOSIS — O99.810 PREGNANCY-INDUCED GLUCOSE INTOLERANCE: Primary | ICD-10-CM

## 2017-07-24 NOTE — TELEPHONE ENCOUNTER
Spoke to Patient she will call back to make appt as she is waiting for her work schedule to come out in two days.

## 2017-07-24 NOTE — TELEPHONE ENCOUNTER
----- Message from Kandis Ruiz CMA sent at 7/24/2017 12:53 PM CDT -----  Please call and schedule 3 Hour GTT. Per Dr. Park.    Thank you,    Kandis

## 2017-08-01 NOTE — TELEPHONE ENCOUNTER
Can you please call Tg again to assist in scheduling her for 3 hrs gtt lab appt, as it's after the time she was expected or had mentioned she would call clinic to schedule. Thank you. Shanti COYNE

## 2017-08-07 NOTE — TELEPHONE ENCOUNTER
Called pt again this morning as there has not been a reply back from pt. Left message for her to call me back. No future bee appt as well as for 3 hr gtt screen. Shanti COYNE

## 2017-08-11 ENCOUNTER — AMBULATORY - HEALTHEAST (OUTPATIENT)
Dept: ADMINISTRATIVE | Facility: REHABILITATION | Age: 32
End: 2017-08-11

## 2017-08-11 ENCOUNTER — OFFICE VISIT (OUTPATIENT)
Dept: FAMILY MEDICINE | Facility: CLINIC | Age: 32
End: 2017-08-11

## 2017-08-11 VITALS
OXYGEN SATURATION: 99 % | BODY MASS INDEX: 41.63 KG/M2 | SYSTOLIC BLOOD PRESSURE: 99 MMHG | WEIGHT: 226.2 LBS | DIASTOLIC BLOOD PRESSURE: 64 MMHG | TEMPERATURE: 97.9 F | HEART RATE: 90 BPM

## 2017-08-11 DIAGNOSIS — O09.00 SUPERVISION OF PREGNANCY WITH HISTORY OF INFERTILITY: Primary | ICD-10-CM

## 2017-08-11 DIAGNOSIS — G56.21 LESION OF RIGHT ULNAR NERVE: Primary | ICD-10-CM

## 2017-08-11 DIAGNOSIS — G56.21 LESION OF RIGHT ULNAR NERVE: ICD-10-CM

## 2017-08-11 LAB
GLU, 1 HOUR, 100 G: 154 MG/DL
GLU, 2 HOUR, 100 G: 131 MG/DL
GLU, 3 HOUR, 100 G: 89 MG/DL
GLUCOSE P FAST SERPL-MCNC: 98 MG/DL

## 2017-08-11 NOTE — PATIENT INSTRUCTIONS
- go to physical therapy to be fitted for an elbow splint  - use the elbow sling most of the time during the day;   - if this is not improving in the next 3 weeks, it would be a good idea to see physical therapy   - continue wearing the wrist brace as needed  - come back as planned for pregnancy care      Referral for ( TEST )  :      Physical Therapy  LOCATION/PLACE/Provider :    [facility] [place]  DATE & TIME :     8- at 3:00  PHONE :     [phone]  FAX :     [phone]  ADDITIONAL INFORMATION :     NA  Appointment made by clinic staff/:    LAZARO

## 2017-08-11 NOTE — PROGRESS NOTES
Preceptor Attestation:  Patient's case reviewed and discussed with Tg Maldonado MD resident and I evaluated the patient. I agree with written assessment and plan of care.  Supervising Physician:Matt Salomon MD    [facility]

## 2017-08-11 NOTE — MR AVS SNAPSHOT
After Visit Summary   8/11/2017    Tg Calero    MRN: [id number]           Patient Information     Date Of Birth          1985        Visit Information        Provider Department      8/11/2017 10:20 AM Tg Maldonado MD [facility]        Today's Diagnoses     Lesion of right ulnar nerve    -  1      Care Instructions    - go to physical therapy to be fitted for an elbow splint  - use the elbow sling most of the time during the day;   - if this is not improving in the next 3 weeks, it would be a good idea to see physical therapy   - continue wearing the wrist brace as needed  - come back as planned for pregnancy care          Follow-ups after your visit        Additional Services     PHYSICAL THERAPY REFERRAL       PT/OT REFERRAL  Tg Calero  1985  Phone #: [phone] (home)    needed: No  Language: English    PT/OT  Facility:   [facility], P: [phone], F: [phone]    History: ulnar neuropathy, pregnant    Precautions/Contraindications: None    Imaging Studies: None    Surgical Procedure/Test Results: None    Treatment Goals:   Other: fitting for elbow splint; will likely need for duration of pregnancy    Prognosis: excellent    Visits: Up to 12    Evaluate: Evaluate and treat    Plan: brace fitting; may need ROM/strength                  Follow-up notes from your care team     Return in about 1 month (around 9/11/2017) for MARK.      Future tests that were ordered for you today     Open Future Orders        Priority Expected Expires Ordered    PHYSICAL THERAPY REFERRAL Routine 8/11/2017 8/11/2018 8/11/2017            Who to contact     Please call your clinic at [phone] to:    Ask questions about your health    Make or cancel appointments    Discuss your medicines    Learn about your test results    Speak to your doctor   If you have compliments or concerns about an experience at your clinic, or if you wish to  file a complaint, please contact [facility] Patient Relations at [phone] or email us at [email or web address]         Additional Information About Your Visit        [employer] Information     [employer] gives you secure access to your electronic health record. If you see a primary care provider, you can also send messages to your care team and make appointments. If you have questions, please call your primary care clinic.  If you do not have a primary care provider, please call [phone] and they will assist you.      [employer] is an electronic gateway that provides easy, online access to your medical records. With [employer], you can request a clinic appointment, read your test results, renew a prescription or communicate with your care team.     To access your existing account, please contact your [facility] or call [phone] for assistance.        Care EveryWhere ID     This is your Care EveryWhere ID. This could be used by other organizations to access your [place] medical records  [id number]        Your Vitals Were     Pulse Temperature Last Period Pulse Oximetry BMI (Body Mass Index)       90 97.9  F (36.6  C) (Oral) 12/02/2016 (Approximate) 99% 41.63 kg/m2        Blood Pressure from Last 3 Encounters:   08/11/17 99/64   07/19/17 115/66   06/12/17 100/63    Weight from Last 3 Encounters:   08/11/17 226 lb 3.2 oz (102.6 kg)   07/19/17 220 lb (99.8 kg)   06/12/17 221 lb 3.2 oz (100.3 kg)              We Performed the Following     SLING     WRIST COCK-UP NON-MOLDED        Primary Care Provider Office Phone # Fax #    Frederick Eddy [phone]       [facility] [address] [place] [address]        Equal Access to Services     MARY THOMAS : Hadii baltazar sandso Sojacobo, waaxda luqadaha, qaybta kaalmada florecitayaanna, christian henning. So [facility] [phone].    ATENCIÓN: Si sangita  español, tiene a mann disposición servicios gratuitos de asistencia lingüística. John hernandez [phone].    We comply with applicable federal civil rights laws and [place] laws. We do not discriminate on the basis of race, color, national origin, age, disability sex, sexual orientation or gender identity.            Thank you!     Thank you for choosing [facility]  for your care. Our goal is always to provide you with excellent care. Hearing back from our patients is one way we can continue to improve our services. Please take a few minutes to complete the written survey that you may receive in the mail after your visit with us. Thank you!             Your Updated Medication List - Protect others around you: Learn how to safely use, store and throw away your medicines at www.disposemymeds.org.          This list is accurate as of: 8/11/17 11:44 AM.  Always use your most recent med list.                   Brand Name Dispense Instructions for use Diagnosis    albuterol 108 (90 BASE) MCG/ACT Inhaler    PROAIR HFA/PROVENTIL HFA/VENTOLIN HFA    3 Inhaler    Inhale 2 puffs into the lungs every 6 hours as needed for shortness of breath / dyspnea    Mild persistent asthma without complication       fluticasone 110 MCG/ACT Inhaler    FLOVENT HFA    3 Inhaler    Inhale 2 puffs into the lungs 2 times daily 2 puffs    Mild persistent asthma without complication       fluticasone 50 MCG/ACT spray    FLONASE    16 g    Spray 1-2 sprays into both nostrils daily    Chronic rhinitis       ketotifen 0.025 % Soln ophthalmic solution    ZADITOR    1 Bottle    Place 1 drop into both eyes every 12 hours    Seasonal allergic conjunctivitis       metFORMIN 500 MG tablet    GLUCOPHAGE    180 tablet    Take 1 tablet (500 mg) by mouth 2 times daily (with meals)    PCOS (polycystic ovarian syndrome)       montelukast 10 MG tablet    SINGULAIR    90 tablet    Take 1 tablet (10 mg) by mouth At Bedtime    Mild persistent asthma without  complication, Chronic rhinitis       order for DME     1 each    Equipment being ordered: carpal tunnel brace right wrist    Tendinitis of extensor tendon of right hand       prenatal multivitamin plus iron 27-0.8 MG Tabs per tablet     100 tablet    Take 1 tablet by mouth daily    Pregnancy test positive

## 2017-08-11 NOTE — PROGRESS NOTES
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  Gen: alert, NAD  HENT: oral mucosa moist  Card: RRR, no murmurs  Resp: CTAB, no wheezing or crackles  Abd: soft  MS: extremities grossly normal; right hand decreased/altered sensation 3rd, 4th, 5th digits; normal strength ulnar, median, and radial nerve testing; negative tinel's at median nerve at wrist, positive tinel's at ulnar nerve at elbow; positive tinel's at wrist at distal ulna; negative phalen's  Ext: no LE edema  Skin: no rashes on exposed skin  Neuro: mentation intact, speech normal  Psych: mood normal, affect normal  Assessment and Plan     1. Lesion of right ulnar nerve  Likely has a component of carpal tunnel given history and symptoms extending to median nerve distribution, but current symptoms consistent with ulnar nerve entrapment at elbow and at distal ulna. Will likely not resolve until after pregnancy. Given new carpal tunnel brace because existing brace is too small.   - PHYSICAL THERAPY REFERRAL; Future  - WRIST COCK-UP NON-MOLDED  - SLING    Follow up: for routine pregnancy care; prn if worsens    Options for treatment and follow-up care were reviewed with the patient and/or guardian. Tg Calero and/or  guardian engaged in the decision making process and verbalized understanding of the options discussed and agreed with the final plan.    Tg Maldonado MD, MPH  [facility] Medicine Resident    Precepted with: Dr. Salomon    PMHX:     Patient Active Problem List   Diagnosis     Allergic rhinitis due to animal dander     Irregular menses     PCOS (polycystic ovarian syndrome)     Mild persistent asthma     Vitamin D deficiency disease     Female infertility     Pap smear for cervical cancer screening     Supervision of pregnancy with history of infertility     Obesity (BMI 35.0-39.9 without comorbidity)     Current Outpatient Prescriptions   Medication Sig Dispense Refill     metFORMIN (GLUCOPHAGE) 500 MG tablet Take 1 tablet (500 mg) by mouth 2 times daily (with meals) 180 tablet 0     Prenatal Vit-Fe Fumarate-FA (PRENATAL MULTIVITAMIN  PLUS IRON) 27-0.8 MG TABS per tablet Take 1 tablet by mouth daily 100 tablet 11     fluticasone (FLOVENT HFA) 110 MCG/ACT Inhaler Inhale 2 puffs into the lungs 2 times daily 2 puffs 3 Inhaler 1     fluticasone (FLONASE) 50 MCG/ACT spray Spray 1-2 sprays into both nostrils daily 16 g 2     montelukast (SINGULAIR) 10 MG tablet Take 1 tablet (10 mg) by mouth At Bedtime 90 tablet 3     albuterol (PROAIR HFA/PROVENTIL HFA/VENTOLIN HFA) 108 (90 BASE) MCG/ACT Inhaler Inhale 2 puffs into the lungs every 6 hours as needed for shortness of breath / dyspnea 3 Inhaler 1     order for DME Equipment being ordered: carpal tunnel brace right wrist 1 each 0     ketotifen (ZADITOR) 0.025 % SOLN ophthalmic solution Place 1 drop into both eyes every 12 hours 1 Bottle 11     Family History   Problem Relation Age of Onset     Asthma Mother      Asthma Maternal Grandmother      Cancer - colorectal Maternal Grandfather      Prostate Cancer Paternal Grandfather      C.A.D. No family hx of      DIABETES No family hx of      Hypertension No family hx of      CEREBROVASCULAR DISEASE No family hx of       Breast Cancer No family hx of      Social History     Social History Narrative     Allergies   Allergen Reactions     Animal Dander      Seasonal Allergies      No results found for this or any previous visit (from the past 24 hour(s)).

## 2017-08-13 PROBLEM — G56.21 LESION OF RIGHT ULNAR NERVE: Status: ACTIVE | Noted: 2017-08-13

## 2017-08-16 ENCOUNTER — OFFICE VISIT (OUTPATIENT)
Dept: FAMILY MEDICINE | Facility: CLINIC | Age: 32
End: 2017-08-16

## 2017-08-16 VITALS
WEIGHT: 223 LBS | HEIGHT: 63 IN | DIASTOLIC BLOOD PRESSURE: 66 MMHG | HEART RATE: 92 BPM | SYSTOLIC BLOOD PRESSURE: 106 MMHG | OXYGEN SATURATION: 98 % | RESPIRATION RATE: 16 BRPM | BODY MASS INDEX: 39.51 KG/M2 | TEMPERATURE: 98.6 F

## 2017-08-16 DIAGNOSIS — Z23 IMMUNIZATION DUE: ICD-10-CM

## 2017-08-16 DIAGNOSIS — O09.00 SUPERVISION OF PREGNANCY WITH HISTORY OF INFERTILITY: Primary | ICD-10-CM

## 2017-08-16 DIAGNOSIS — D64.9 ANEMIA, UNSPECIFIED TYPE: ICD-10-CM

## 2017-08-16 LAB
BILIRUBIN UR: NEGATIVE
BLOOD UR: NEGATIVE
CLARITY, URINE: CLEAR
COLOR UR: YELLOW
GLUCOSE URINE: NEGATIVE
HEMOGLOBIN: 11.6 G/DL (ref 11.7–15.7)
KETONES UR QL: NEGATIVE
LEUKOCYTE ESTERASE UR: NEGATIVE
NITRITE UR QL STRIP: NEGATIVE
PH UR STRIP: 6.5 [PH] (ref 5–7)
PROTEIN UR: NEGATIVE
SP GR UR STRIP: 1.02
UROBILINOGEN UR STRIP-ACNC: NORMAL

## 2017-08-16 NOTE — PATIENT INSTRUCTIONS
[facility] Information  If you have any further concerns or wish to schedule another appointment, please call our office at [phone] during normal business hours (8-5, M-F).     For uncomplicated pregnancies, you will be seeing your doctor once a month until you are 28 weeks, then you will see your doctor twice a month. You will begin weekly visits at 36 weeks until you deliver.     If you have urgent medical questions that cannot wait, you may call [phone] at any time of day.     If you have a medical emergency, please call [phone].    When to call or come in to the hospital  If you notice a decrease in your baby's movement.   If your begin to experience contractions that are 5 minutes or less apart and lasting for over 45 seconds, or if contractions are becoming more painful.  If you have any bleeding or leakage of fluids.   If you have a headache not resolved with tylenol, right upper abdominal pain, or sudden onset of swelling.  You know your body best. Never hesitate to call or go to the hospital if something doesn't feel right!  After-baby Birth Control Methods   It is important to consider contraception after your baby is born if you would like to prevent a pregnancy in the near future. If you are breast feeding, talk with your doctor to determine the best method for you. It is recommended that you wait 12 months after the birth of your baby to get pregnant again.   Condoms   Latex condoms can prevent pregnancy and STDs. Condoms work best when used with spermicide that is placed inside the vagina as well as inside the condom. Use only water-based lubricants. Petroleum based products (such as Vaseline and many massage oils) can weaken the latex and cause it to break.   Iud   IUD's are made of flexible plastic and must be inserted into your uterus by a doctor. The IUD works by stopping the fertilized egg from attaching itself to the uterus. IUDs may increase the risk of getting a pelvic  infection (PID), which can lead to infertility if not diagnosed and treated early. This is a great option after delivery of your baby! These last for 3, 5, or 10 years depending up on which type you choose, but can be removed earlier if you decide you would like to get pregnant sooner.  Tubal Ligation & Vasectomy   These are good choices for women and men who know that they do not want to have any more children.     HORMONES   Birth control pills, hormone implants and shots work by stopping ovulation (release of the egg from the ovary). Implants are placed in the upper arm by a minor surgical incision. They last for five years, but can be removed by your doctor if you decide to get pregnant. Hormone injections must be repeated every three months. The Pill must be taken every day.   All hormones can have side effects and create certain health risks. They are very effective in preventing pregnancy but they do not prevent STDs. You can talk more about the risks and benefits with your doctor.

## 2017-08-16 NOTE — PROGRESS NOTES
The patient is a 31 year old    presenting at 30w4d  for follow up ob.  She  denies any HA, CP, SOB, abd pain/contractions, vaginal bleeding or abnormal discharge. Baby has been moving normally.  Her new concerns today include having some stretching abdominal pain and some tenderness in pelvic region.      Exam: Cardiac: RRR no m/r/g  Lungs: CTAB no r/c/w  Abd: gravid uterus appropriate for gestational age, non-tender.  Neuro: reflexes normal symmetric at the patella.    A/P  1) Pregnancy-   Would like to switch providers. I stated she can continue to see me, but I may not be present around the time of her delivery. She will look into her options  - Pasted 3hour glucose tolerance  - TDap today  - urine for cramping and pink urine (one time episode)  - Hgb check  - RTC in 2 weeks    Precepted with: Adele Varma MD

## 2017-08-16 NOTE — MR AVS SNAPSHOT
"              After Visit Summary   8/16/2017    Tg Calero    MRN: [id number]           Patient Information     Date Of Birth          1985        Visit Information        Provider Department      8/16/2017 2:40 PM Maykel Redman MD [facility]        Today's Diagnoses     Supervision of pregnancy with history of infertility    -  1       Follow-ups after your visit        Who to contact     Please call your clinic at [phone] to:    Ask questions about your health    Make or cancel appointments    Discuss your medicines    Learn about your test results    Speak to your doctor   If you have compliments or concerns about an experience at your clinic, or if you wish to file a complaint, please contact [facility] Patient Relations at [phone] or email us at [email or web address]         Additional Information About Your Visit        MyChart Information     ZMPt gives you secure access to your electronic health record. If you see a primary care provider, you can also send messages to your care team and make appointments. If you have questions, please call your primary care clinic.  If you do not have a primary care provider, please call [phone] and they will assist you.      Moolta is an electronic gateway that provides easy, online access to your medical records. With Moolta, you can request a clinic appointment, read your test results, renew a prescription or communicate with your care team.     To access your existing account, please contact your [facility] or call [phone] for assistance.        Care EveryWhere ID     This is your Care EveryWhere ID. This could be used by other organizations to access your [place] medical records  [id number]        Your Vitals Were     Pulse Temperature Respirations Height Last Period Pulse Oximetry    92 98.6  F (37  C) (Oral) 16 5' 2.6\" (159 cm) 12/02/2016 " (Approximate) 98%    BMI (Body Mass Index)                   40.01 kg/m2            Blood Pressure from Last 3 Encounters:   08/16/17 106/66   08/11/17 99/64   07/19/17 115/66    Weight from Last 3 Encounters:   08/16/17 223 lb (101.2 kg)   08/11/17 226 lb 3.2 oz (102.6 kg)   07/19/17 220 lb (99.8 kg)              We Performed the Following     Hemoglobin (HGB) ([facility])     Urinalysis, Micro If ([facility])        Primary Care Provider Office Phone # Fax #    Frederick Eddy [phone]       [facility] [address] [place] [address]        Equal Access to Services     MARY THOMAS : Hadii aad ku hadasho Sojacobo, waaxda luqadaha, qaybta kaalmada adeegyada, christian dickinson . So [facility] [phone].    ATENCIÓN: Si habla español, tiene a mann disposición servicios gratuitos de asistencia lingüística. Llame al [phone].    We comply with applicable federal civil rights laws and [place] laws. We do not discriminate on the basis of race, color, national origin, age, disability sex, sexual orientation or gender identity.            Thank you!     Thank you for choosing [facility]  for your care. Our goal is always to provide you with excellent care. Hearing back from our patients is one way we can continue to improve our services. Please take a few minutes to complete the written survey that you may receive in the mail after your visit with us. Thank you!             Your Updated Medication List - Protect others around you: Learn how to safely use, store and throw away your medicines at www.disposemymeds.org.          This list is accurate as of: 8/16/17  3:35 PM.  Always use your most recent med list.                   Brand Name Dispense Instructions for use Diagnosis    albuterol 108 (90 BASE) MCG/ACT Inhaler    PROAIR HFA/PROVENTIL HFA/VENTOLIN HFA    3 Inhaler    Inhale 2 puffs into the lungs every 6 hours as needed for shortness of breath /  dyspnea    Mild persistent asthma without complication       fluticasone 110 MCG/ACT Inhaler    FLOVENT HFA    3 Inhaler    Inhale 2 puffs into the lungs 2 times daily 2 puffs    Mild persistent asthma without complication       fluticasone 50 MCG/ACT spray    FLONASE    16 g    Spray 1-2 sprays into both nostrils daily    Chronic rhinitis       ketotifen 0.025 % Soln ophthalmic solution    ZADITOR    1 Bottle    Place 1 drop into both eyes every 12 hours    Seasonal allergic conjunctivitis       metFORMIN 500 MG tablet    GLUCOPHAGE    180 tablet    Take 1 tablet (500 mg) by mouth 2 times daily (with meals)    PCOS (polycystic ovarian syndrome)       montelukast 10 MG tablet    SINGULAIR    90 tablet    Take 1 tablet (10 mg) by mouth At Bedtime    Mild persistent asthma without complication, Chronic rhinitis       order for DME     1 each    Equipment being ordered: carpal tunnel brace right wrist    Tendinitis of extensor tendon of right hand       prenatal multivitamin plus iron 27-0.8 MG Tabs per tablet     100 tablet    Take 1 tablet by mouth daily    Pregnancy test positive

## 2017-08-16 NOTE — NURSING NOTE
SHANTE 10/21/17  31 w 4 d  The most recent recommendation is to provide Tdap in every pregnancy, ideally at 27-36 weeks gestation

## 2017-08-19 RX ORDER — FERROUS SULFATE 325(65) MG
325 TABLET ORAL
Qty: 30 TABLET | Refills: 2 | Status: SHIPPED | OUTPATIENT
Start: 2017-08-19 | End: 2018-05-31

## 2017-08-28 NOTE — PROGRESS NOTES
Preceptor Attestation:  Patient's case reviewed and discussed with Maykel Redman MD.  Patient seen and discussed with the resident.  I agree with assessment and plan of care.  Supervising Physician:  Adele Vrama MD  [facility]

## 2017-09-05 ENCOUNTER — OFFICE VISIT (OUTPATIENT)
Dept: FAMILY MEDICINE | Facility: CLINIC | Age: 32
End: 2017-09-05

## 2017-09-05 VITALS
BODY MASS INDEX: 42.36 KG/M2 | TEMPERATURE: 97.5 F | HEIGHT: 62 IN | OXYGEN SATURATION: 98 % | HEART RATE: 81 BPM | DIASTOLIC BLOOD PRESSURE: 64 MMHG | WEIGHT: 230.2 LBS | SYSTOLIC BLOOD PRESSURE: 103 MMHG

## 2017-09-05 DIAGNOSIS — L29.9 ITCHING: ICD-10-CM

## 2017-09-05 DIAGNOSIS — R11.0 NAUSEA: ICD-10-CM

## 2017-09-05 DIAGNOSIS — R10.11 RUQ ABDOMINAL PAIN: Primary | ICD-10-CM

## 2017-09-05 LAB
% GRANULOCYTES: 78.6 %G (ref 40–75)
ALBUMIN SERPL-MCNC: 2.8 G/DL (ref 3.3–4.6)
ALP SERPL-CCNC: 98 U/L (ref 40–150)
ALT SERPL-CCNC: 17 U/L (ref 0–45)
AST SERPL-CCNC: 17 U/L (ref 0–45)
BILIRUB SERPL-MCNC: 0.4 MG/DL (ref 0.2–1.3)
BUN SERPL-MCNC: 6 MG/DL (ref 5–24)
CALCIUM SERPL-MCNC: 9 MG/DL (ref 8.5–10.4)
CHLORIDE SERPLBLD-SCNC: 102 MMOL/L (ref 94–109)
CO2 SERPL-SCNC: 24 MMOL/L (ref 20–32)
CREAT SERPL-MCNC: 0.5 MG/DL (ref 0.6–1.3)
EGFR CALCULATED (BLACK REFERENCE): >90 ML/MIN
EGFR CALCULATED (NON BLACK REFERENCE): >90 ML/MIN
GLUCOSE SERPL-MCNC: 83 MG/DL (ref 60–109)
GRANULOCYTES #: 8.3 K/UL (ref 1.6–8.3)
HCT VFR BLD AUTO: 36.1 % (ref 35–47)
HEMOGLOBIN: 11.5 G/DL (ref 11.7–15.7)
LYMPHOCYTES # BLD AUTO: 1.7 K/UL (ref 0.8–5.3)
LYMPHOCYTES NFR BLD AUTO: 16.5 %L (ref 20–48)
MCH RBC QN AUTO: 31 PG (ref 26.5–35)
MCHC RBC AUTO-ENTMCNC: 31.9 G/DL (ref 32–36)
MCV RBC AUTO: 97.2 FL (ref 78–100)
MID #: 0.5 K/UL (ref 0–2.2)
MID %: 4.9 %M (ref 0–20)
PLATELET # BLD AUTO: 256 K/UL (ref 150–450)
POTASSIUM SERPL-SCNC: 4 MMOL/L (ref 3.4–5.3)
PROT SERPL-MCNC: 6.2 G/DL (ref 6.6–8.8)
RBC # BLD AUTO: 3.71 M/UL (ref 3.8–5.2)
SODIUM SERPL-SCNC: 137 MMOL/L (ref 133–144)
WBC # BLD AUTO: 10.6 K/UL (ref 4–11)

## 2017-09-05 RX ORDER — ONDANSETRON 4 MG/1
4 TABLET, FILM COATED ORAL EVERY 8 HOURS PRN
Qty: 18 TABLET | Refills: 0 | Status: SHIPPED | OUTPATIENT
Start: 2017-09-05 | End: 2017-09-22

## 2017-09-05 NOTE — PATIENT INSTRUCTIONS
Important Takeaway Points From This Visit:    We will call with your lab results    Please keep your appointment with us this Friday    I recommend you use ginger or over the counter vitamin B6 for nausea    If your nausea is not improving I have given you a few tablets of Zofran to be taken 4 mg every 8 hours as needed    I have also given you a prescription for an antacid called zantac. This can be taken twice a day as needed for reflux pain symptoms.    Please call the clinic if you have questions or your symptoms change      As always, please call with any questions or concerns. I look forward to seeing you again soon!    Take care,  Dr. Eddy    Your current medication list is printed. Please keep this with you - it is helpful to bring this current list to any other medical appointments. It can also be helpful if you ever go to the emergency room or hospital.    If you had lab testing today we will call you with the results. The phone number we will call with your results is # [phone] (home) . If this is not the best number please call our clinic and change the number.    If you need any refills, please call your pharmacy and they will contact us.    If you have any further concerns or wish to schedule another appointment, please call our office at [phone] during normal business hours (8-5, M-F).    If you have urgent medical questions that cannot wait, you may call [phone] at any time of day.    If you have a medical emergency, please call 272.    Thank you for coming to [facility].

## 2017-09-05 NOTE — PROGRESS NOTES
"       HPI:   Tg Calero is a 31 year old  female who presents to clinic today for symptoms of right hand itchiness and right upper quadrant/epigastric abdominal pain, both of which started 1.5-2 weeks ago. Patient is 33 weeks pregnant by 10 week u/s.     Her right hand itchiness is mainly around the base of the right thumb. She has not had any associated pain, dry skin, rash, or decrease in range of motion. It has not spread anywhere. It is intermittent. Mainly just annoying to her and doesn't know if it is associated with her abdominal discomfort.    Her stomach pain is located in the RUQ/epigastrum as noted above. It is characterized as a burning sensation that does not radiate. She sometimes has nausea with this and this is unusual to her as she \"has not had any morning sickness during the entire pregnancy\". It is worse with movement such as walking or standing and is also intermittent. She has had an intermittent headache for the last few weeks, but it has resolved and not been present for the last 2 days. She was previously taking tylenol for this. She has not had any vision changes, fever, chills, chest pain, shortness of breath, vomiting, diarrhea, constipation, bloody/tarry stools, or increased leg swelling. She is still feeling baby kick frequently. She has not had any vaginal bleeding or changes in discharge. She does not notice that her pain corresponds to eating food and can not make any other associations.       Patient is an established patient of this clinic.         PMHX:   Active Problems List  Patient Active Problem List   Diagnosis     Allergic rhinitis due to animal dander     Irregular menses     PCOS (polycystic ovarian syndrome)     Mild persistent asthma     Vitamin D deficiency disease     Female infertility     Pap smear for cervical cancer screening     Supervision of pregnancy with history of infertility     Obesity (BMI 35.0-39.9 without comorbidity)     Lesion of right ulnar " "nerve     Active problem list reviewed and updated.    Current Medications  Current Outpatient Prescriptions   Medication Sig Dispense Refill     ferrous sulfate (IRON) 325 (65 FE) MG tablet Take 1 tablet (325 mg) by mouth daily (with breakfast) 30 tablet 2     metFORMIN (GLUCOPHAGE) 500 MG tablet Take 1 tablet (500 mg) by mouth 2 times daily (with meals) 180 tablet 0     Prenatal Vit-Fe Fumarate-FA (PRENATAL MULTIVITAMIN  PLUS IRON) 27-0.8 MG TABS per tablet Take 1 tablet by mouth daily 100 tablet 11     fluticasone (FLOVENT HFA) 110 MCG/ACT Inhaler Inhale 2 puffs into the lungs 2 times daily 2 puffs 3 Inhaler 1     fluticasone (FLONASE) 50 MCG/ACT spray Spray 1-2 sprays into both nostrils daily 16 g 2     montelukast (SINGULAIR) 10 MG tablet Take 1 tablet (10 mg) by mouth At Bedtime 90 tablet 3     albuterol (PROAIR HFA/PROVENTIL HFA/VENTOLIN HFA) 108 (90 BASE) MCG/ACT Inhaler Inhale 2 puffs into the lungs every 6 hours as needed for shortness of breath / dyspnea 3 Inhaler 1     order for DME Equipment being ordered: carpal tunnel brace right wrist 1 each 0     ketotifen (ZADITOR) 0.025 % SOLN ophthalmic solution Place 1 drop into both eyes every 12 hours 1 Bottle 11     Medication list reviewed and updated.    Social History  Social History   Substance Use Topics     Smoking status: Former Smoker     Years: 2.00     Types: Cigarettes     Quit date: 1/1/2010     Smokeless tobacco: Never Used     Alcohol use No      Comment: every so often     History   Drug Use No     Allergies  Allergies   Allergen Reactions     Animal Dander      Seasonal Allergies      Allergies and Medication Intolerances Updated         Physical Exam:     Vitals:    09/05/17 1422   BP: 103/64   Pulse: 81   Temp: 97.5  F (36.4  C)   TempSrc: Oral   SpO2: 98%   Weight: 230 lb 3.2 oz (104.4 kg)   Height: 5' 2\" (157.5 cm)     Body mass index is 42.1 kg/(m^2).    General: Appears well and in no acute distress.  HEENT: Eyes grossly normal to " inspection. Extraocular movements intact. Pupils equal, round, and reactive to light. Mucous membranes moist. No ulcers or lesions noted in the oropharynx.  Cardiovascular: Regular rate and rhythm, normal S1 and S2 without murmur. No extra heartsounds or friction rub. Radial pulses present and equal bilaterally.  Respiratory: Lungs clear to auscultation bilaterally. No wheezing or crackles.   GI/Rectal: Some subjective discomfort with palpation of epigastrium and RUQ, no guarding. Soft, non-distended abdomen. No hepatomegaly.  -Female: Fundus 34 cm. Fetal heart tones 151 bmp.    Assessment and Plan   1. RUQ abdominal pain: Unclear etiology at this time. Low suspicion for pre-eclampsia given patient's normal blood pressures and otherwise lack of symptoms. Consider GERD/Gastritis as well as gallbladder/liver disease. This would fit with objective exam findings. Will check CBC looking at plts and white count as well as CMP looking at hepatic profile and electrolytes. Most likely related to GI cause such as GERD given burning nature of pain that is intermittent; however, I would expect it to have more relationship to position or eating. Will trial Zantac as patient also has itching complaint. Will follow-up labs and vannessa patient with results. Determination for for further evaluation at that time. If abnormal or symptoms persist despite normal labs, would likely get RUQ ultrasound.  - Comprehensive Metabolic Panel ([facility]) - results <1hr Protocol  - CBC with Diff Plt ([facility])  - ranitidine (ZANTAC) 150 MG tablet; Take 1 tablet (150 mg) by mouth 2 times daily  Dispense: 60 tablet; Refill: 1    2. Nausea: As above, likely related to abdominal pain from either GERD, gastritis, or gallbladder pathology. Recommended OTC vitamin B6 or vazquez. If not helping, may try prescribed Zofran. Zantac given for nausea and pain as well if related to gastritis/GERD. F/u PRN if symptoms not improving. Will call with above lab results and  determine if any further intervention is required at that time.  - ondansetron (ZOFRAN) 4 MG tablet; Take 1 tablet (4 mg) by mouth every 8 hours as needed for nausea  Dispense: 18 tablet; Refill: 0  - ranitidine (ZANTAC) 150 MG tablet; Take 1 tablet (150 mg) by mouth 2 times daily  Dispense: 60 tablet; Refill: 1    3. Itching: Zantac may also give some relief as a histamine-2 receptor blocker. Will hold off on prescribing H1 blocker for now. Can try cold to relieve symptoms. Look for any items she may come in contact with that may be causing symptoms. No objective findings on exam however.    Options for treatment and follow-up care were reviewed with the patient and/or guardian. Tg Calero and/or guardian engaged in the decision making process and verbalized understanding of the options discussed and agreed with the final plan.    Frederikc Eddy MD  [facility] Resident  Pager# [phone]    Precepted with: Adele Varma MD      This chart is completed utilizing dictation software; typos and/or incorrect word substitutions may unintentionally occur.

## 2017-09-05 NOTE — MR AVS SNAPSHOT
After Visit Summary   9/5/2017    Tg Calero    MRN: [id number]           Patient Information     Date Of Birth          1985        Visit Information        Provider Department      9/5/2017 2:40 PM Frederick Eddy MD [facility]        Today's Diagnoses     RUQ abdominal pain    -  1    Nausea          Care Instructions    Important Takeaway Points From This Visit:    We will call with your lab results    Please keep your appointment with us this Friday    I recommend you use ginger or over the counter vitamin B6 for nausea    If your nausea is not improving I have given you a few tablets of Zofran to be taken 4 mg every 8 hours as needed    I have also given you a prescription for an antacid called zantac. This can be taken twice a day as needed for reflux pain symptoms.    Please call the clinic if you have questions or your symptoms change      As always, please call with any questions or concerns. I look forward to seeing you again soon!    Take care,  Dr. Eddy    Your current medication list is printed. Please keep this with you - it is helpful to bring this current list to any other medical appointments. It can also be helpful if you ever go to the emergency room or hospital.    If you had lab testing today we will call you with the results. The phone number we will call with your results is # [phone] (home) . If this is not the best number please call our clinic and change the number.    If you need any refills, please call your pharmacy and they will contact us.    If you have any further concerns or wish to schedule another appointment, please call our office at [phone] during normal business hours (8-5, M-F).    If you have urgent medical questions that cannot wait, you may call [phone] at any time of day.    If you have a medical emergency, please call 272.    Thank you for coming to [facility].              Follow-ups after  "your visit        Your next 10 appointments already scheduled     Sep 08, 2017 11:00 AM CDT   RETURN OB with Maykel Redman MD   [facility] ([facility])    [address] [place] [address]   [phone]              Who to contact     Please call your clinic at [phone] to:    Ask questions about your health    Make or cancel appointments    Discuss your medicines    Learn about your test results    Speak to your doctor   If you have compliments or concerns about an experience at your clinic, or if you wish to file a complaint, please contact [facility] Patient Relations at [phone] or email us at [email or web address]         Additional Information About Your Visit        Innometricshart Information     Eastbeam gives you secure access to your electronic health record. If you see a primary care provider, you can also send messages to your care team and make appointments. If you have questions, please call your primary care clinic.  If you do not have a primary care provider, please call [phone] and they will assist you.      Eastbeam is an electronic gateway that provides easy, online access to your medical records. With Eastbeam, you can request a clinic appointment, read your test results, renew a prescription or communicate with your care team.     To access your existing account, please contact your [facility] or call [phone] for assistance.        Care EveryWhere ID     This is your Care EveryWhere ID. This could be used by other organizations to access your [place] medical records  [id number]        Your Vitals Were     Pulse Temperature Height Last Period Pulse Oximetry BMI (Body Mass Index)    81 97.5  F (36.4  C) (Oral) 5' 2\" (157.5 cm) 12/02/2016 (Approximate) 98% 42.1 kg/m2       Blood Pressure from Last 3 Encounters:   09/05/17 103/64   08/16/17 106/66   08/11/17 99/64    Weight from Last " 3 Encounters:   09/05/17 230 lb 3.2 oz (104.4 kg)   08/16/17 223 lb (101.2 kg)   08/11/17 226 lb 3.2 oz (102.6 kg)              We Performed the Following     CBC with Diff Plt ([facility])     Comprehensive Metabolic Panel ([facility]) - results <1hr Protocol          Today's Medication Changes          These changes are accurate as of: 9/5/17  3:13 PM.  If you have any questions, ask your nurse or doctor.               Start taking these medicines.        Dose/Directions    ondansetron 4 MG tablet   Commonly known as:  ZOFRAN   Used for:  Nausea   Started by:  Frederick Eddy MD        Dose:  4 mg   Take 1 tablet (4 mg) by mouth every 8 hours as needed for nausea   Quantity:  18 tablet   Refills:  0       ranitidine 150 MG tablet   Commonly known as:  ZANTAC   Used for:  Nausea, RUQ abdominal pain   Started by:  Frederick Eddy MD        Dose:  150 mg   Take 1 tablet (150 mg) by mouth 2 times daily   Quantity:  60 tablet   Refills:  1            Where to get your medicines      These medications were sent to [facility] [address] [place] [address] E  [address] [place] [id number]     Phone:  [phone]     ondansetron 4 MG tablet    ranitidine 150 MG tablet                Primary Care Provider Office Phone # Fax #    Frederick Eddy [phone]       [facility] [address] [place] [address]        Equal Access to Services     [facility] CenterVAL[facility]IE AH: Hadii aad ku hadasho Soomaali, waaxda luqadaha, qaybta kaalmada adeegyada, waxay patricia haysherice henning. So [facility] [phone].    ATENCIÓN: Si habla español, tiene a mann disposición servicios gratuitos de asistencia lingüística. Llame al [phone].    We comply with applicable federal civil rights laws and [place] laws. We do not discriminate on the basis of race, color, national origin, age, disability sex, sexual orientation or  gender identity.            Thank you!     Thank you for choosing [facility]  for your care. Our goal is always to provide you with excellent care. Hearing back from our patients is one way we can continue to improve our services. Please take a few minutes to complete the written survey that you may receive in the mail after your visit with us. Thank you!             Your Updated Medication List - Protect others around you: Learn how to safely use, store and throw away your medicines at www.disposemymeds.org.          This list is accurate as of: 9/5/17  3:14 PM.  Always use your most recent med list.                   Brand Name Dispense Instructions for use Diagnosis    albuterol 108 (90 BASE) MCG/ACT Inhaler    PROAIR HFA/PROVENTIL HFA/VENTOLIN HFA    3 Inhaler    Inhale 2 puffs into the lungs every 6 hours as needed for shortness of breath / dyspnea    Mild persistent asthma without complication       ferrous sulfate 325 (65 FE) MG tablet    IRON    30 tablet    Take 1 tablet (325 mg) by mouth daily (with breakfast)    Anemia, unspecified type       fluticasone 110 MCG/ACT Inhaler    FLOVENT HFA    3 Inhaler    Inhale 2 puffs into the lungs 2 times daily 2 puffs    Mild persistent asthma without complication       fluticasone 50 MCG/ACT spray    FLONASE    16 g    Spray 1-2 sprays into both nostrils daily    Chronic rhinitis       ketotifen 0.025 % Soln ophthalmic solution    ZADITOR    1 Bottle    Place 1 drop into both eyes every 12 hours    Seasonal allergic conjunctivitis       metFORMIN 500 MG tablet    GLUCOPHAGE    180 tablet    Take 1 tablet (500 mg) by mouth 2 times daily (with meals)    PCOS (polycystic ovarian syndrome)       montelukast 10 MG tablet    SINGULAIR    90 tablet    Take 1 tablet (10 mg) by mouth At Bedtime    Mild persistent asthma without complication, Chronic rhinitis       ondansetron 4 MG tablet    ZOFRAN    18 tablet    Take 1 tablet (4 mg) by mouth every 8 hours as  needed for nausea    Nausea       order for DME     1 each    Equipment being ordered: carpal tunnel brace right wrist    Tendinitis of extensor tendon of right hand       prenatal multivitamin plus iron 27-0.8 MG Tabs per tablet     100 tablet    Take 1 tablet by mouth daily    Pregnancy test positive       ranitidine 150 MG tablet    ZANTAC    60 tablet    Take 1 tablet (150 mg) by mouth 2 times daily    Nausea, RUQ abdominal pain

## 2017-09-06 ENCOUNTER — TELEPHONE (OUTPATIENT)
Dept: FAMILY MEDICINE | Facility: CLINIC | Age: 32
End: 2017-09-06

## 2017-09-08 ENCOUNTER — OFFICE VISIT (OUTPATIENT)
Dept: FAMILY MEDICINE | Facility: CLINIC | Age: 32
End: 2017-09-08

## 2017-09-08 VITALS
SYSTOLIC BLOOD PRESSURE: 104 MMHG | WEIGHT: 233 LBS | HEART RATE: 90 BPM | OXYGEN SATURATION: 98 % | HEIGHT: 62 IN | TEMPERATURE: 98.2 F | DIASTOLIC BLOOD PRESSURE: 64 MMHG | BODY MASS INDEX: 42.88 KG/M2

## 2017-09-08 DIAGNOSIS — Z34.83 ENCOUNTER FOR SUPERVISION OF OTHER NORMAL PREGNANCY IN THIRD TRIMESTER: Primary | ICD-10-CM

## 2017-09-08 DIAGNOSIS — H10.10 SEASONAL ALLERGIC CONJUNCTIVITIS: ICD-10-CM

## 2017-09-08 NOTE — MR AVS SNAPSHOT
After Visit Summary   9/8/2017    Tg Calero    MRN: [id number]           Patient Information     Date Of Birth          1985        Visit Information        Provider Department      9/8/2017 11:00 AM Maykel Redman MD [facility]        Today's Diagnoses     Encounter for supervision of other normal pregnancy in third trimester    -  1    Seasonal allergic conjunctivitis           Follow-ups after your visit        Follow-up notes from your care team     Return in about 2 weeks (around 9/22/2017).      Your next 10 appointments already scheduled     Sep 18, 2017 11:40 AM CDT   RETURN OB with Maykel Redman MD   [facility] ([facility])    [address] [place] [id number]   [phone]              Who to contact     Please call your clinic at [phone] to:    Ask questions about your health    Make or cancel appointments    Discuss your medicines    Learn about your test results    Speak to your doctor   If you have compliments or concerns about an experience at your clinic, or if you wish to file a complaint, please contact [facility] Patient Relations at [phone] or email us at [email or web address]         Additional Information About Your Visit        MyChart Information     Med[employer] gives you secure access to your electronic health record. If you see a primary care provider, you can also send messages to your care team and make appointments. If you have questions, please call your primary care clinic.  If you do not have a primary care provider, please call [phone] and they will assist you.      epicurio is an electronic gateway that provides easy, online access to your medical records. With epicurio, you can request a clinic appointment, read your test results, renew a prescription or communicate with your care team.     To access your existing account, please contact your  "[facility] or call [phone] for assistance.        Care EveryWhere ID     This is your Care EveryWhere ID. This could be used by other organizations to access your [place] medical records  [id number]        Your Vitals Were     Pulse Temperature Height Last Period Pulse Oximetry BMI (Body Mass Index)    90 98.2  F (36.8  C) (Oral) 5' 2.21\" (158 cm) 12/02/2016 (Approximate) 98% 42.34 kg/m2       Blood Pressure from Last 3 Encounters:   09/08/17 104/64   09/05/17 103/64   08/16/17 106/66    Weight from Last 3 Encounters:   09/08/17 233 lb (105.7 kg)   09/05/17 230 lb 3.2 oz (104.4 kg)   08/16/17 223 lb (101.2 kg)              Today, you had the following     No orders found for display       Primary Care Provider Office Phone # Fax #    Frederick Eddy [phone]       [facility] [address] [place] [address]        Equal Access to Services     [facility] : Hadii aad ku hadasho Soomaali, waaxda luqadaha, qaybta kaalmada ademarylinyada, christian dickinson . So [facility] [phone].    ATENCIÓN: Si habla español, tiene a mann disposición servicios gratuitos de asistencia lingüística. [facility] [phone].    We comply with applicable federal civil rights laws and [place] laws. We do not discriminate on the basis of race, color, national origin, age, disability sex, sexual orientation or gender identity.            Thank you!     Thank you for choosing [facility]  for your care. Our goal is always to provide you with excellent care. Hearing back from our patients is one way we can continue to improve our services. Please take a few minutes to complete the written survey that you may receive in the mail after your visit with us. Thank you!             Your Updated Medication List - Protect others around you: Learn how to safely use, store and throw away your medicines at www.disposemymeds.org.          This list is " accurate as of: 9/8/17 11:59 PM.  Always use your most recent med list.                   Brand Name Dispense Instructions for use Diagnosis    albuterol 108 (90 BASE) MCG/ACT Inhaler    PROAIR HFA/PROVENTIL HFA/VENTOLIN HFA    3 Inhaler    Inhale 2 puffs into the lungs every 6 hours as needed for shortness of breath / dyspnea    Mild persistent asthma without complication       ferrous sulfate 325 (65 FE) MG tablet    IRON    30 tablet    Take 1 tablet (325 mg) by mouth daily (with breakfast)    Anemia, unspecified type       fluticasone 110 MCG/ACT Inhaler    FLOVENT HFA    3 Inhaler    Inhale 2 puffs into the lungs 2 times daily 2 puffs    Mild persistent asthma without complication       fluticasone 50 MCG/ACT spray    FLONASE    16 g    Spray 1-2 sprays into both nostrils daily    Chronic rhinitis       ketotifen 0.025 % Soln ophthalmic solution    ZADITOR    1 Bottle    Place 1 drop into both eyes every 12 hours    Seasonal allergic conjunctivitis       metFORMIN 500 MG tablet    GLUCOPHAGE    180 tablet    Take 1 tablet (500 mg) by mouth 2 times daily (with meals)    PCOS (polycystic ovarian syndrome)       montelukast 10 MG tablet    SINGULAIR    90 tablet    Take 1 tablet (10 mg) by mouth At Bedtime    Mild persistent asthma without complication, Chronic rhinitis       ondansetron 4 MG tablet    ZOFRAN    18 tablet    Take 1 tablet (4 mg) by mouth every 8 hours as needed for nausea    Nausea       order for DME     1 each    Equipment being ordered: carpal tunnel brace right wrist    Tendinitis of extensor tendon of right hand       prenatal multivitamin plus iron 27-0.8 MG Tabs per tablet     100 tablet    Take 1 tablet by mouth daily    Pregnancy test positive       ranitidine 150 MG tablet    ZANTAC    60 tablet    Take 1 tablet (150 mg) by mouth 2 times daily    Nausea, RUQ abdominal pain

## 2017-09-08 NOTE — PROGRESS NOTES
The patient is a 31 year old  presenting at 33w6d for follow up ob.  She denies any fever, CP, SOB, abd pain/contractions, vaginal bleeding or abnormal discharge. Baby has been moving normally.  No new concerns today. RUQ pain has continued but the patient believes that it is likely due to muscle problem because it worsens with activity. She just picked up zantac and zofran but has not taken it yet. Itchiness comes and goes but has decreased. Nausea has resolved. She was unable to find anything that she may have come in contact with to cause the itchiness. She knows that she cannot take tums because she is on iron supplements and she has been drinking 4-6 glasses of milk. She has headaches that come and go. No headaches prior to pregnancy. Headaches have been more frequent but possibly due to stress of to the RUQ pain. Takes Tylenol PRN and that seems to alleviate the pain.     Exam: Cardiac: RRR no m/r/g  Lungs: CTAB no r/c/w  Abd: gravid uterus appropriate for gestational age, non-tender.  Neuro: reflexes normal symmetric at the patella.    A/P  1) Pregnancy- Return in 2 weeks for regular OB visit. Plan on breastfeeding.  - If more significant weight gain and uterus size, will set up growth US      2) RUQ pain - Likely muscle related. Take Zantac for reflux. Likely not need Zofran. Only use Zofran if increased nausea and vomiting. Previous negative pre-eclampsia work up.    Annabeatriz Melendrez, MS3 acted as scribe    Marquita Redman MD    I, Marquita Redman, was present for the entire history taking and completed the physical exam.    Precepted with: Joan Eden MD

## 2017-09-15 NOTE — PROGRESS NOTES
Preceptor Attestation:  Patient's case reviewed and discussed with Maykel Redman MD resident and I evaluated the patient. I agree with written assessment and plan of care.  Supervising Physician:  FAZAL JENSEN MD  [facility]

## 2017-09-18 ENCOUNTER — OFFICE VISIT (OUTPATIENT)
Dept: FAMILY MEDICINE | Facility: CLINIC | Age: 32
End: 2017-09-18

## 2017-09-18 VITALS
WEIGHT: 235.2 LBS | HEART RATE: 89 BPM | DIASTOLIC BLOOD PRESSURE: 68 MMHG | TEMPERATURE: 97.7 F | SYSTOLIC BLOOD PRESSURE: 106 MMHG | OXYGEN SATURATION: 97 % | BODY MASS INDEX: 41.67 KG/M2 | HEIGHT: 63 IN

## 2017-09-18 DIAGNOSIS — Z34.83 ENCOUNTER FOR SUPERVISION OF OTHER NORMAL PREGNANCY IN THIRD TRIMESTER: Primary | ICD-10-CM

## 2017-09-18 NOTE — MR AVS SNAPSHOT
After Visit Summary   9/18/2017    Tg Calero    MRN: [id number]           Patient Information     Date Of Birth          1985        Visit Information        Provider Department      9/18/2017 11:40 AM Maykel Redman MD [facility]        Today's Diagnoses     Encounter for supervision of other normal pregnancy in third trimester    -  1      Care Instructions    [facility] Information  If you have any further concerns or wish to schedule another appointment, please call our office at [phone] during normal business hours (8-5, M-F).     For uncomplicated pregnancies, you will be seeing your doctor once a month until you are 28 weeks, then you will see your doctor twice a month. You will begin weekly visits at 36 weeks until you deliver.     If you have urgent medical questions that cannot wait, you may call [phone] at any time of day.     If you have a medical emergency, please call [phone].    When to call or come in to the hospital  If you notice a decrease in your baby's movement.   If your begin to experience contractions that are 5 minutes or less apart and lasting for over 45 seconds, or if contractions are becoming more painful.  If you have any bleeding or leakage of fluids.   If you have a headache not resolved with tylenol, right upper abdominal pain, or sudden onset of swelling.  You know your body best. Never hesitate to call or go to the hospital if something doesn't feel right!    Preparing for the hospital  You re likely feeling anxious as your child s birth approaches. This is normal. To give yourself some peace of mind, pack a bag 3-4 weeks before your due date. Here is a list of things to remember:  Personal care items like toothbrush, hair brush, lip balm, lotion, shampoo, glasses, contacts  Nightgown, bathrobe, slippers  Several pairs of underwear  Comfortable clothes for you to wear home  Camera with new batteries  Cell phone and    Insurance information and any other paperwork needed for your hospital stay  Clothes for baby to wear home  An infant, rear-facing car seat for bringing home your baby (this is required by law)  Managing Labor Pain   There are many ways to manage pain during labor. It can often be done with no anesthesia or strong pain medications. Talk to your health care provider about any options you would like to explore.   Help from Relaxation  Some of these are learned in special classes. Your health care provider can help you find classes. The hospital has a tub you can use during early labor. These methods may be of help to you:   Breathing techniques   A warm tub between contractions   Massage and therapeutic touch by your support person or labor    Reading materials that are comforting or inspiring   Music that is soothing   Hypnosis   Acupuncture and acupressure   Heat and cold applicatio  Help From Analgesics   Analgesics are mild medications that reduce pain. They can be used along with some relaxation methods. They can give you pain relief without total loss of feeling. They may lessen the pain of strong contractions. You may feel well enough to nap between contractions. They have little effect on your baby if given early in labor. This may be done by injection or by IV.   Help From Anesthetics   Anesthesia involves blockage of all feeling including pain. It can be given in the form of local anesthesia or general anesthesia.  Anesthesia is a type of medication to prevent pain. It is often used in labor. It may numb only one region of your body. This is called regional anesthesia. Or it may let you sleep during surgery. This is called general anesthesia. This type of medication is given by a trained specialist. When possible, regional anesthesia will be used. This is so you can be awake during your baby s birth.   Regional Anesthesia   Regional anesthesia may be used to numb your lower body for a vaginal  or  birth. It does not go into your bloodstream. This means that little or none of it will reach your baby. There are two kinds:   Epidural. This is most often given while you sit up or lie on your side. A needle with a flexible tube (catheter) is put in your lower back. The needle is then removed. The anesthetic is sent through the catheter. A pump may be attached. This gives you a constant level of anesthetic. An epidural often only partly affects muscle control. This means you should still be able to push for a vaginal birth.   Spinal. This is most often given in one dose right before delivery. It acts fast. You may sit up or lie down when it is injected. It may affect muscle control in your lower body. This includes the ability to push.    General Anesthesia   General anesthesia lets you sleep and keeps you free of pain during surgery. It may be used for a . It may be given as an injection. It may be given as an inhaled gas. Or it may be given as both. Delivery often occurs before the medication has reached the baby.              Follow-ups after your visit        Follow-up notes from your care team     Return in about 1 week (around 2017).      Your next 10 appointments already scheduled     Sep 25, 2017  9:20 AM CDT   RETURN OB with Maykel Redman MD   [facility] ([facility])    [address]  [place] [address]   [phone]            Oct 02, 2017  9:00 AM CDT   RETURN OB with Maykel Redman MD   [facility] ([facility])    [address]  [place] [address]   [phone]            Oct 09, 2017 10:20 AM CDT   RETURN OB with Maykel Redman MD   [facility] ([facility])    [address]  [facility] [address]   [phone]              Who to contact     Please call your clinic at [phone] to:    Ask questions about your health    Make or cancel appointments    Discuss your  "medicines    Learn about your test results    Speak to your doctor   If you have compliments or concerns about an experience at your clinic, or if you wish to file a complaint, please contact [facility] Patient Relations at [phone] or email us at [email or web address]         Additional Information About Your Visit        MyChart Information     Factonomyhart gives you secure access to your electronic health record. If you see a primary care provider, you can also send messages to your care team and make appointments. If you have questions, please call your primary care clinic.  If you do not have a primary care provider, please call [phone] and they will assist you.      Barkibu is an electronic gateway that provides easy, online access to your medical records. With [employer], you can request a clinic appointment, read your test results, renew a prescription or communicate with your care team.     To access your existing account, please contact your [facility] or call [phone] for assistance.        Care EveryWhere ID     This is your Care EveryWhere ID. This could be used by other organizations to access your [place] medical records  [id number]        Your Vitals Were     Pulse Temperature Height Last Period Pulse Oximetry BMI (Body Mass Index)    89 97.7  F (36.5  C) (Oral) 5' 2.5\" (158.8 cm) 12/02/2016 (Approximate) 97% 42.33 kg/m2       Blood Pressure from Last 3 Encounters:   09/18/17 106/68   09/08/17 104/64   09/05/17 103/64    Weight from Last 3 Encounters:   09/18/17 235 lb 3.2 oz (106.7 kg)   09/08/17 233 lb (105.7 kg)   09/05/17 230 lb 3.2 oz (104.4 kg)              Today, you had the following     No orders found for display       Primary Care Provider Office Phone # Fax #    Frederick Eddy [phone]       [facility] [address] [place] [address]        Equal Access to Services     " [facility] : Hadii aad ku tina Gomez, waaxda luqadaha, qaybta kaalmada adeshana, christian myrnain hayaawoodrow florezmarylin ching teena . So [facility] [phone].    ATENCIÓN: Si habla espjeremy, tiene a mann disposición servicios gratuitos de asistencia lingüística. Llame al [phone].    We comply with applicable federal civil rights laws and [place] laws. We do not discriminate on the basis of race, color, national origin, age, disability sex, sexual orientation or gender identity.            Thank you!     Thank you for choosing [facility]  for your care. Our goal is always to provide you with excellent care. Hearing back from our patients is one way we can continue to improve our services. Please take a few minutes to complete the written survey that you may receive in the mail after your visit with us. Thank you!             Your Updated Medication List - Protect others around you: Learn how to safely use, store and throw away your medicines at www.disposemymeds.org.          This list is accurate as of: 9/18/17 12:07 PM.  Always use your most recent med list.                   Brand Name Dispense Instructions for use Diagnosis    albuterol 108 (90 BASE) MCG/ACT Inhaler    PROAIR HFA/PROVENTIL HFA/VENTOLIN HFA    3 Inhaler    Inhale 2 puffs into the lungs every 6 hours as needed for shortness of breath / dyspnea    Mild persistent asthma without complication       ferrous sulfate 325 (65 FE) MG tablet    IRON    30 tablet    Take 1 tablet (325 mg) by mouth daily (with breakfast)    Anemia, unspecified type       fluticasone 110 MCG/ACT Inhaler    FLOVENT HFA    3 Inhaler    Inhale 2 puffs into the lungs 2 times daily 2 puffs    Mild persistent asthma without complication       fluticasone 50 MCG/ACT spray    FLONASE    16 g    Spray 1-2 sprays into both nostrils daily    Chronic rhinitis       ketotifen 0.025 % Soln ophthalmic solution    ZADITOR    1 Bottle    Place 1 drop into both eyes every 12 hours     Seasonal allergic conjunctivitis       metFORMIN 500 MG tablet    GLUCOPHAGE    180 tablet    Take 1 tablet (500 mg) by mouth 2 times daily (with meals)    PCOS (polycystic ovarian syndrome)       montelukast 10 MG tablet    SINGULAIR    90 tablet    Take 1 tablet (10 mg) by mouth At Bedtime    Mild persistent asthma without complication, Chronic rhinitis       ondansetron 4 MG tablet    ZOFRAN    18 tablet    Take 1 tablet (4 mg) by mouth every 8 hours as needed for nausea    Nausea       order for DME     1 each    Equipment being ordered: carpal tunnel brace right wrist    Tendinitis of extensor tendon of right hand       prenatal multivitamin plus iron 27-0.8 MG Tabs per tablet     100 tablet    Take 1 tablet by mouth daily    Pregnancy test positive       ranitidine 150 MG tablet    ZANTAC    60 tablet    Take 1 tablet (150 mg) by mouth 2 times daily    Nausea, RUQ abdominal pain

## 2017-09-18 NOTE — PROGRESS NOTES
The patient is a 32 year old    presenting at 35w2d  for follow up ob.  She  denies any HA, CP, SOB, abd pain/contractions, vaginal bleeding or abnormal discharge. Baby has been moving normally.  Her new concerns today include none.    Exam: Cardiac: RRR no m/r/g  Lungs: CTAB no r/c/w  Abd: gravid uterus appropriate for gestational age, non-tender.  Neuro: reflexes normal symmetric at the patella.    A/P  1) Pregnancy- weekly appointments until delivery  - Return in 1 weeks for regular OB visit. Plan on breastfeeding.  - Weight gain appropriate  - GBS swab next week      Precepted with: Dr. Salomon

## 2017-09-18 NOTE — PATIENT INSTRUCTIONS
[facility] Information  If you have any further concerns or wish to schedule another appointment, please call our office at [phone] during normal business hours (8-5, M-F).     For uncomplicated pregnancies, you will be seeing your doctor once a month until you are 28 weeks, then you will see your doctor twice a month. You will begin weekly visits at 36 weeks until you deliver.     If you have urgent medical questions that cannot wait, you may call [phone] at any time of day.     If you have a medical emergency, please call [phone].    When to call or come in to the hospital  If you notice a decrease in your baby's movement.   If your begin to experience contractions that are 5 minutes or less apart and lasting for over 45 seconds, or if contractions are becoming more painful.  If you have any bleeding or leakage of fluids.   If you have a headache not resolved with tylenol, right upper abdominal pain, or sudden onset of swelling.  You know your body best. Never hesitate to call or go to the hospital if something doesn't feel right!    Preparing for the hospital  You re likely feeling anxious as your child s birth approaches. This is normal. To give yourself some peace of mind, pack a bag 3-4 weeks before your due date. Here is a list of things to remember:  Personal care items like toothbrush, hair brush, lip balm, lotion, shampoo, glasses, contacts  Nightgown, bathrobe, slippers  Several pairs of underwear  Comfortable clothes for you to wear home  Camera with new batteries  Cell phone and   Insurance information and any other paperwork needed for your hospital stay  Clothes for baby to wear home  An infant, rear-facing car seat for bringing home your baby (this is required by law)  Managing Labor Pain   There are many ways to manage pain during labor. It can often be done with no anesthesia or strong pain medications. Talk to your health care provider about any options you would like to  explore.   Help from Relaxation  Some of these are learned in special classes. Your health care provider can help you find classes. The hospital has a tub you can use during early labor. These methods may be of help to you:   Breathing techniques   A warm tub between contractions   Massage and therapeutic touch by your support person or labor    Reading materials that are comforting or inspiring   Music that is soothing   Hypnosis   Acupuncture and acupressure   Heat and cold applicatio  Help From Analgesics   Analgesics are mild medications that reduce pain. They can be used along with some relaxation methods. They can give you pain relief without total loss of feeling. They may lessen the pain of strong contractions. You may feel well enough to nap between contractions. They have little effect on your baby if given early in labor. This may be done by injection or by IV.   Help From Anesthetics   Anesthesia involves blockage of all feeling including pain. It can be given in the form of local anesthesia or general anesthesia.  Anesthesia is a type of medication to prevent pain. It is often used in labor. It may numb only one region of your body. This is called regional anesthesia. Or it may let you sleep during surgery. This is called general anesthesia. This type of medication is given by a trained specialist. When possible, regional anesthesia will be used. This is so you can be awake during your baby s birth.   Regional Anesthesia   Regional anesthesia may be used to numb your lower body for a vaginal or  birth. It does not go into your bloodstream. This means that little or none of it will reach your baby. There are two kinds:   Epidural. This is most often given while you sit up or lie on your side. A needle with a flexible tube (catheter) is put in your lower back. The needle is then removed. The anesthetic is sent through the catheter. A pump may be attached. This gives you a constant level of  anesthetic. An epidural often only partly affects muscle control. This means you should still be able to push for a vaginal birth.   Spinal. This is most often given in one dose right before delivery. It acts fast. You may sit up or lie down when it is injected. It may affect muscle control in your lower body. This includes the ability to push.    General Anesthesia   General anesthesia lets you sleep and keeps you free of pain during surgery. It may be used for a . It may be given as an injection. It may be given as an inhaled gas. Or it may be given as both. Delivery often occurs before the medication has reached the baby.

## 2017-09-18 NOTE — PROGRESS NOTES
Preceptor Attestation:  Patient's case reviewed and discussed with Maykel Redman MD resident and I evaluated the patient. I agree with written assessment and plan of care.  Supervising Physician:Matt Salomon MD    [facility]

## 2017-09-22 ENCOUNTER — TELEPHONE (OUTPATIENT)
Dept: FAMILY MEDICINE | Facility: CLINIC | Age: 32
End: 2017-09-22

## 2017-09-22 ENCOUNTER — OFFICE VISIT (OUTPATIENT)
Dept: FAMILY MEDICINE | Facility: CLINIC | Age: 32
End: 2017-09-22

## 2017-09-22 VITALS
SYSTOLIC BLOOD PRESSURE: 111 MMHG | DIASTOLIC BLOOD PRESSURE: 68 MMHG | WEIGHT: 231.2 LBS | HEART RATE: 98 BPM | TEMPERATURE: 97.9 F | BODY MASS INDEX: 40.96 KG/M2 | RESPIRATION RATE: 16 BRPM | OXYGEN SATURATION: 98 % | HEIGHT: 63 IN

## 2017-09-22 DIAGNOSIS — N93.9 VAGINAL BLEEDING: ICD-10-CM

## 2017-09-22 DIAGNOSIS — Z34.83 ENCOUNTER FOR SUPERVISION OF OTHER NORMAL PREGNANCY IN THIRD TRIMESTER: Primary | ICD-10-CM

## 2017-09-22 LAB
BACTERIA: NORMAL
CLUE CELLS: NORMAL
MOTILE TRICHOMONAS: NEGATIVE
ODOR: NORMAL
PH WET PREP: NORMAL
WBC WET PREP: NORMAL
YEAST: NORMAL

## 2017-09-22 NOTE — MR AVS SNAPSHOT
After Visit Summary   9/22/2017    Tg Calero    MRN: [id number]           Patient Information     Date Of Birth          1985        Visit Information        Provider Department      9/22/2017 11:20 AM Maykel Redman MD [facility]        Today's Diagnoses     Encounter for supervision of other normal pregnancy in third trimester    -  1    Vaginal bleeding           Follow-ups after your visit        Your next 10 appointments already scheduled     Oct 02, 2017  9:00 AM CDT   RETURN OB with Maykel Redman MD   [facility] ([facility])    [address] [place] [address]   [phone]            Oct 09, 2017 10:20 AM CDT   RETURN OB with Maykel Redman MD   [facility] ([facility])    [address] [place] [address]   [phone]              Who to contact     Please call your clinic at [phone] to:    Ask questions about your health    Make or cancel appointments    Discuss your medicines    Learn about your test results    Speak to your doctor   If you have compliments or concerns about an experience at your clinic, or if you wish to file a complaint, please contact [facility] Patient Relations at [phone] or email us at [email or web address]         Additional Information About Your Visit        MyChart Information     [employer] gives you secure access to your electronic health record. If you see a primary care provider, you can also send messages to your care team and make appointments. If you have questions, please call your primary care clinic.  If you do not have a primary care provider, please call [phone] and they will assist you.      [employer] is an electronic gateway that provides easy, online access to your medical records. With [employer], you can request a clinic appointment, read your test results, renew a prescription or communicate  "with your care team.     To access your existing account, please contact your [facility] or call [phone] for assistance.        Care EveryWhere ID     This is your Care EveryWhere ID. This could be used by other organizations to access your [facility] medical records  [id number]        Your Vitals Were     Pulse Temperature Respirations Height Last Period Pulse Oximetry    98 97.9  F (36.6  C) (Oral) 16 5' 2.5\" (158.8 cm) 12/02/2016 (Approximate) 98%    BMI (Body Mass Index)                   41.61 kg/m2            Blood Pressure from Last 3 Encounters:   09/22/17 111/68   09/18/17 106/68   09/08/17 104/64    Weight from Last 3 Encounters:   09/22/17 231 lb 3.2 oz (104.9 kg)   09/18/17 235 lb 3.2 oz (106.7 kg)   09/08/17 233 lb (105.7 kg)              We Performed the Following     Chlamydia/Gono Amplified ("[employer]     Clt. Grp B Strp Screen ("[employer]     Wet Prep ([employer])          Today's Medication Changes          These changes are accurate as of: 9/22/17 11:59 PM.  If you have any questions, ask your nurse or doctor.               Stop taking these medicines if you haven't already. Please contact your care team if you have questions.     ondansetron 4 MG tablet   Commonly known as:  ZOFRAN   Stopped by:  Maykle Redman MD                    Primary Care Provider Office Phone # Fax #    Frederick Eddy [phone]       [facility] [address] [place] [address]        Equal Access to Services     [facility] AH: Hadii baltazar heath hadashjaciel Sojacobo, waaxda luqadaha, qaybta kaalmachristian oden. So [facility] [phone].    ATENCIÓN: Si habla español, tiene a mann disposición servicios gratuitos de asistencia lingüística. Llame al [phone].    We comply with applicable federal civil rights laws and [place] laws. We do not discriminate on the basis of race, color, national origin, age, " disability, sex, sexual orientation, or gender identity.            Thank you!     Thank you for choosing [facility]  for your care. Our goal is always to provide you with excellent care. Hearing back from our patients is one way we can continue to improve our services. Please take a few minutes to complete the written survey that you may receive in the mail after your visit with us. Thank you!             Your Updated Medication List - Protect others around you: Learn how to safely use, store and throw away your medicines at www.disposemymeds.org.          This list is accurate as of: 9/22/17 11:59 PM.  Always use your most recent med list.                   Brand Name Dispense Instructions for use Diagnosis    albuterol 108 (90 BASE) MCG/ACT Inhaler    PROAIR HFA/PROVENTIL HFA/VENTOLIN HFA    3 Inhaler    Inhale 2 puffs into the lungs every 6 hours as needed for shortness of breath / dyspnea    Mild persistent asthma without complication       ferrous sulfate 325 (65 FE) MG tablet    IRON    30 tablet    Take 1 tablet (325 mg) by mouth daily (with breakfast)    Anemia, unspecified type       fluticasone 110 MCG/ACT Inhaler    FLOVENT HFA    3 Inhaler    Inhale 2 puffs into the lungs 2 times daily 2 puffs    Mild persistent asthma without complication       fluticasone 50 MCG/ACT spray    FLONASE    16 g    Spray 1-2 sprays into both nostrils daily    Chronic rhinitis       ketotifen 0.025 % Soln ophthalmic solution    ZADITOR    1 Bottle    Place 1 drop into both eyes every 12 hours    Seasonal allergic conjunctivitis       metFORMIN 500 MG tablet    GLUCOPHAGE    180 tablet    Take 1 tablet (500 mg) by mouth 2 times daily (with meals)    PCOS (polycystic ovarian syndrome)       montelukast 10 MG tablet    SINGULAIR    90 tablet    Take 1 tablet (10 mg) by mouth At Bedtime    Mild persistent asthma without complication, Chronic rhinitis       order for DME     1 each    Equipment being ordered: carpal  tunnel brace right wrist    Tendinitis of extensor tendon of right hand       prenatal multivitamin plus iron 27-0.8 MG Tabs per tablet     100 tablet    Take 1 tablet by mouth daily    Pregnancy test positive       ranitidine 150 MG tablet    ZANTAC    60 tablet    Take 1 tablet (150 mg) by mouth 2 times daily    Nausea, RUQ abdominal pain

## 2017-09-22 NOTE — PROGRESS NOTES
The patient is a 32 year old    presenting at 35w6d  for follow up ob.  She  denies any HA, CP, SOB, abd pain/contractions, or abnormal discharge. Baby has been moving normally.  Her new concerns today include vaginal bleeding.  Happened once this morning after urinating. Noticed when wiping. No blood in toilet. No evidence for hemorrhoids. Maybe lost her mucous plug earlier this week.    Exam: Cardiac: RRR no m/r/g  Lungs: CTAB no r/c/w  Abd: gravid uterus appropriate for gestational age, non-tender, obese, 1 excoration callie. Neuro: reflexes normal symmetric at the patella. Vaginal Exam: bimanual exam nontender, cervix closed, no blood on glove after exam.  Speculum exam reveals normal vaginal discharge with blood and normal cervix    A/P  1) Pregnancy-   - Obtained GBS swab today    2) Urethral irritation with bleeding  - No evidence for vaginal bleeding on exam today  - Did obtain wet prep and gc/chlamidia  - one episode, no further symptoms  - RTC on Monday for follow up  - If abdominal pain or contractions - encouraged Tg to go to [facility]    Precepted with: Joan Eden MD

## 2017-09-22 NOTE — TELEPHONE ENCOUNTER
"Spoke to patient. Pt is concerned due to recently having one incident of brownish gooey  Discharge. Stated she had one incident of pink red streak discharge today and was seen. No further discharge until now.   Abdominal tenderness at top and bottom of abdomen. More at top then at bottom. Can not rate as it is \"just tenderness\" and nocticed only when bending or crouching down.     Reassurance given and advised to continue to monitor. Discussed with her that she did had a speculum exam done today and the brownish discharge could be from that. To continue to monitor if abdominal pain is getting worse or severe, need to be seen. If brownish discharge persist to call anytime, on call clinic doctor. To continue to monitor for baby movement, loss of fluid, contraction. To call if any concern. If loss of fluid, need to go to maternity. Stated understanding.   "

## 2017-09-23 LAB
ALLERGIC TO PENICILLIN: NO
GP B STREP AG SPEC QL LA: NEGATIVE

## 2017-09-25 LAB
C TRACH RRNA CVX QL NAA+PROBE: NEGATIVE
N GONORRHOEA RRNA SPEC QL NAA+PROBE: NEGATIVE

## 2017-09-26 ENCOUNTER — ANESTHESIA - HEALTHEAST (OUTPATIENT)
Dept: OBGYN | Facility: HOSPITAL | Age: 32
End: 2017-09-26

## 2017-09-26 ENCOUNTER — TELEPHONE (OUTPATIENT)
Dept: FAMILY MEDICINE | Facility: CLINIC | Age: 32
End: 2017-09-26

## 2017-09-26 NOTE — TELEPHONE ENCOUNTER
On call clinic call    About one hour ago felt like she was leaking fluid. Clear, maybe yellow. Layed down in bed and the fluid leakage repeated for a total of 3 times.    No contractions, no vaginal bleeding. Feeling baby move normally. No headaches, change in vision, abdominal pain.    Patient has never experienced SROM before (needed induction in the past).    9/22/17: GBS negative, GC/chlamydia negative, wet prep negative.    I encouraged her to come into [facility] for rule out PPROM.    Piper Coffman MD (PGY2)  Pager: [phone]  [facility] Resident

## 2017-10-02 ENCOUNTER — COMMUNICATION - HEALTHEAST (OUTPATIENT)
Dept: OBGYN | Facility: HOSPITAL | Age: 32
End: 2017-10-02

## 2017-10-02 NOTE — PROGRESS NOTES
Preceptor Attestation:  Patient's case reviewed and discussed with Maykel Redman MD.  Patient seen and discussed with the resident.  I agree with assessment and plan of care.  Supervising Physician:  Adele Varma MD  [facility]

## 2017-11-02 ENCOUNTER — CARE COORDINATION (OUTPATIENT)
Dept: FAMILY MEDICINE | Facility: CLINIC | Age: 32
End: 2017-11-02

## 2017-11-02 DIAGNOSIS — O09.00 SUPERVISION OF PREGNANCY WITH HISTORY OF INFERTILITY: ICD-10-CM

## 2017-11-02 NOTE — PROGRESS NOTES
, normal, spontaneous, vaginal delivery on 2017 at 36w 4d. Complicated by loose nuchal cord, delivered through.  Female  (Rashmi Day), birth weight- 6 lbs 9.1 oz, length- 19 inches and head- 34.3 cm. One and five mins. No laceration. EBL 100ml, apgar were 8/9.  Pregnancy resolved. Shanti RN

## 2017-11-30 DIAGNOSIS — J45.30 MILD PERSISTENT ASTHMA WITHOUT COMPLICATION: ICD-10-CM

## 2017-11-30 DIAGNOSIS — E28.2 PCOS (POLYCYSTIC OVARIAN SYNDROME): ICD-10-CM

## 2017-11-30 RX ORDER — FLUTICASONE PROPIONATE 110 UG/1
2 AEROSOL, METERED RESPIRATORY (INHALATION) 2 TIMES DAILY
Qty: 54 G | Refills: 3 | Status: SHIPPED | OUTPATIENT
Start: 2017-11-30 | End: 2018-04-30

## 2017-12-12 ENCOUNTER — OFFICE VISIT (OUTPATIENT)
Dept: FAMILY MEDICINE | Facility: CLINIC | Age: 32
End: 2017-12-12

## 2017-12-12 VITALS
WEIGHT: 223.6 LBS | OXYGEN SATURATION: 98 % | HEART RATE: 56 BPM | BODY MASS INDEX: 39.62 KG/M2 | DIASTOLIC BLOOD PRESSURE: 71 MMHG | HEIGHT: 63 IN | TEMPERATURE: 98.1 F | SYSTOLIC BLOOD PRESSURE: 108 MMHG

## 2017-12-12 DIAGNOSIS — Z30.09 GENERAL COUNSELING FOR PRESCRIPTION OF ORAL CONTRACEPTIVES: ICD-10-CM

## 2017-12-12 RX ORDER — NORGESTIMATE AND ETHINYL ESTRADIOL 7DAYSX3 LO
1 KIT ORAL DAILY
Qty: 84 TABLET | Refills: 1 | Status: SHIPPED | OUTPATIENT
Start: 2017-12-12 | End: 2018-05-07

## 2017-12-12 NOTE — PROGRESS NOTES
"       Subjective       Tg Calero is a 32 year old  female who presents for routine post partum follow up. She is formula feeding her infant. PHQ-9 score of 0. She wants OCP as her form of birth control. No history of blood clots or tobacco use (former user). She has not resumed sexual intercourse, but denies pelvic pain, perineal pain, and no reported vaginal bleeding or discharge. She states she is in good health at this time.    Pertinent ROS: 5-Point Review of Systems Negative -- Except as noted above.         Objective     Vitals:    12/12/17 0928   BP: 108/71   Pulse: 56   Temp: 98.1  F (36.7  C)   TempSrc: Oral   SpO2: 98%   Weight: 223 lb 9.6 oz (101.4 kg)   Height: 5' 2.5\" (158.8 cm)     Body mass index is 40.25 kg/(m^2).    GENERAL APPEARANCE: healthy, alert and no distress  EYES: Eyes grossly normal to inspection and sclerae normal  RESP: lungs clear to auscultation - no rales, rhonchi or wheezes  CV: regular rates and rhythm, normal S1 S2, no S3 or S4, no murmur, click or rub, no peripheral edema and peripheral pulses strong  ABDOMEN: soft, nontender, no hepatosplenomegaly, no masses and bowel sounds normal   (female): normal female external genitalia, vaginal mucosa pink, moist, well rugated and normal cervix, adnexae, and uterus without masses. Normal vaginal discharge  MS: no musculoskeletal defects are noted and gait is age appropriate without ataxia  SKIN: no suspicious lesions or rashes  NEURO: Grossly normal, no focal deficits  PSYCH: mentation appears normal and affect normal/bright           Assessment/Plan       (Z39.2) Routine postpartum follow-up  (primary encounter diagnosis)  Comment:   Plan: Normal postpartum exam. No evidence of history suggestive of postpartum depression. Will start OCP as form of birth control    (Z30.09) General counseling for prescription of oral contraceptives  Comment:   Plan: norgestim-eth estrad triphasic (ORTHO         TRI-CYCLEN LO) 0.18/0.215/0.25 " MG-25 MCG per         tablet          Options for treatment and follow-up care were reviewed with the patient and/or guardian. Tg Calero and/or guardian engaged in the decision making process and verbalized understanding of the options discussed and agreed with the final plan.    Maykel Redman MD      Precepted today with: Dr. Criss Neff

## 2017-12-12 NOTE — PROGRESS NOTES
Preceptor Attestation:  Patient's case reviewed and discussed with  Patient seen and discussed with the resident..  I agree with written assessment and plan of care.  Supervising Physician:  Kiera Neff MD  [facility]

## 2017-12-12 NOTE — MR AVS SNAPSHOT
After Visit Summary   12/12/2017    Tg Calero    MRN: [id number]           Patient Information     Date Of Birth          1985        Visit Information        Provider Department      12/12/2017 9:20 AM Maykel Redman MD [facility]        Today's Diagnoses     Routine postpartum follow-up    -  1    General counseling for prescription of oral contraceptives           Follow-ups after your visit        Follow-up notes from your care team     Return if symptoms worsen or fail to improve.      Who to contact     Please call your clinic at [phone] to:    Ask questions about your health    Make or cancel appointments    Discuss your medicines    Learn about your test results    Speak to your doctor   If you have compliments or concerns about an experience at your clinic, or if you wish to file a complaint, please contact [facility] Patient Relations at [phone] or email us at [email or web address]         Additional Information About Your Visit        MyChart Information     [employer] gives you secure access to your electronic health record. If you see a primary care provider, you can also send messages to your care team and make appointments. If you have questions, please call your primary care clinic.  If you do not have a primary care provider, please call [phone] and they will assist you.      [employer] is an electronic gateway that provides easy, online access to your medical records. With [employer], you can request a clinic appointment, read your test results, renew a prescription or communicate with your care team.     To access your existing account, please contact your [facility] or call [phone] for assistance.        Care EveryWhere ID     This is your Care EveryWhere ID. This could be used by other organizations to access your [facility] medical records  [id number]        Your Vitals  "Were     Pulse Temperature Height Pulse Oximetry BMI (Body Mass Index)       56 98.1  F (36.7  C) (Oral) 5' 2.5\" (158.8 cm) 98% 40.25 kg/m2        Blood Pressure from Last 3 Encounters:   12/12/17 108/71   09/22/17 111/68   09/18/17 106/68    Weight from Last 3 Encounters:   12/12/17 223 lb 9.6 oz (101.4 kg)   09/22/17 231 lb 3.2 oz (104.9 kg)   09/18/17 235 lb 3.2 oz (106.7 kg)              Today, you had the following     No orders found for display         Today's Medication Changes          These changes are accurate as of: 12/12/17 11:59 PM.  If you have any questions, ask your nurse or doctor.               Start taking these medicines.        Dose/Directions    norgestim-eth estrad triphasic 0.18/0.215/0.25 MG-25 MCG per tablet   Commonly known as:  ORTHO TRI-CYCLEN LO   Used for:  General counseling for prescription of oral contraceptives   Started by:  Maykel Redman MD        Dose:  1 tablet   Take 1 tablet by mouth daily   Quantity:  84 tablet   Refills:  1         Stop taking these medicines if you haven't already. Please contact your care team if you have questions.     ranitidine 150 MG tablet   Commonly known as:  ZANTAC   Stopped by:  Maykel Redman MD                Where to get your medicines      [facility] [address] [place] [id number]     Phone:  [phone]     norgestim-eth estrad triphasic 0.18/0.215/0.25 MG-25 MCG per tablet                Primary Care Provider Office Phone # Fax #    Frederick Eddy [phone]       [facility] [address] [place] [address]        Equal Access to Services     MARY THOMAS AH: Hadii aad ku hadasho Soomaali, waaxda luqadaha, qaybta kaalbranden ademarylinyaanna, christian henning. [facility] [phone].    ATENCIÓN: Si daxa velasquez a mann " disposición servicios gratuitos de asistencia lingüística. John hernandez [phone].    We comply with applicable federal civil rights laws and [place] laws. We do not discriminate on the basis of race, color, national origin, age, disability, sex, sexual orientation, or gender identity.            Thank you!     Thank you for choosing [facility]  for your care. Our goal is always to provide you with excellent care. Hearing back from our patients is one way we can continue to improve our services. Please take a few minutes to complete the written survey that you may receive in the mail after your visit with us. Thank you!             Your Updated Medication List - Protect others around you: Learn how to safely use, store and throw away your medicines at www.disposemymeds.org.          This list is accurate as of: 12/12/17 11:59 PM.  Always use your most recent med list.                   Brand Name Dispense Instructions for use Diagnosis    albuterol 108 (90 BASE) MCG/ACT Inhaler    PROAIR HFA/PROVENTIL HFA/VENTOLIN HFA    3 Inhaler    Inhale 2 puffs into the lungs every 6 hours as needed for shortness of breath / dyspnea    Mild persistent asthma without complication       ferrous sulfate 325 (65 FE) MG tablet    IRON    30 tablet    Take 1 tablet (325 mg) by mouth daily (with breakfast)    Anemia, unspecified type       fluticasone 110 MCG/ACT Inhaler    FLOVENT HFA    54 g    Inhale 2 puffs into the lungs 2 times daily 2 puffs    Mild persistent asthma without complication       fluticasone 50 MCG/ACT spray    FLONASE    16 g    Spray 1-2 sprays into both nostrils daily    Chronic rhinitis       ketotifen 0.025 % Soln ophthalmic solution    ZADITOR    1 Bottle    Place 1 drop into both eyes every 12 hours    Seasonal allergic conjunctivitis       metFORMIN 500 MG tablet    GLUCOPHAGE    180 tablet    Take 1 tablet (500 mg) by mouth 2 times daily (with meals)    PCOS (polycystic ovarian syndrome)        montelukast 10 MG tablet    SINGULAIR    90 tablet    Take 1 tablet (10 mg) by mouth At Bedtime    Mild persistent asthma without complication, Chronic rhinitis       norgestim-eth estrad triphasic 0.18/0.215/0.25 MG-25 MCG per tablet    ORTHO TRI-CYCLEN LO    84 tablet    Take 1 tablet by mouth daily    General counseling for prescription of oral contraceptives       order for DME     1 each    Equipment being ordered: carpal tunnel brace right wrist    Tendinitis of extensor tendon of right hand       prenatal multivitamin plus iron 27-0.8 MG Tabs per tablet     100 tablet    Take 1 tablet by mouth daily    Pregnancy test positive

## 2018-01-25 DIAGNOSIS — H10.10 SEASONAL ALLERGIC CONJUNCTIVITIS: ICD-10-CM

## 2018-04-30 DIAGNOSIS — J31.0 CHRONIC RHINITIS: ICD-10-CM

## 2018-04-30 DIAGNOSIS — J45.30 MILD PERSISTENT ASTHMA WITHOUT COMPLICATION: ICD-10-CM

## 2018-04-30 RX ORDER — FLUTICASONE PROPIONATE 110 UG/1
2 AEROSOL, METERED RESPIRATORY (INHALATION) 2 TIMES DAILY
Qty: 54 G | Refills: 3 | Status: SHIPPED | OUTPATIENT
Start: 2018-04-30

## 2018-04-30 RX ORDER — FLUTICASONE PROPIONATE 50 MCG
1-2 SPRAY, SUSPENSION (ML) NASAL DAILY
Qty: 16 G | Refills: 3 | Status: SHIPPED | OUTPATIENT
Start: 2018-04-30

## 2018-05-02 DIAGNOSIS — J45.30 MILD PERSISTENT ASTHMA WITHOUT COMPLICATION: ICD-10-CM

## 2018-05-02 DIAGNOSIS — Z32.01 PREGNANCY TEST POSITIVE: ICD-10-CM

## 2018-05-02 DIAGNOSIS — J31.0 CHRONIC RHINITIS: ICD-10-CM

## 2018-05-03 RX ORDER — MONTELUKAST SODIUM 10 MG/1
10 TABLET ORAL AT BEDTIME
Qty: 90 TABLET | Refills: 3 | Status: SHIPPED | OUTPATIENT
Start: 2018-05-03

## 2018-05-03 RX ORDER — PRENATAL VIT/IRON FUM/FOLIC AC 27MG-0.8MG
1 TABLET ORAL DAILY
Qty: 100 TABLET | Refills: 11 | Status: SHIPPED | OUTPATIENT
Start: 2018-05-03 | End: 2019-11-26

## 2018-05-07 DIAGNOSIS — Z30.09 GENERAL COUNSELING FOR PRESCRIPTION OF ORAL CONTRACEPTIVES: ICD-10-CM

## 2018-05-07 RX ORDER — NORGESTIMATE AND ETHINYL ESTRADIOL 7DAYSX3 LO
1 KIT ORAL DAILY
Qty: 84 TABLET | Refills: 1 | Status: SHIPPED | OUTPATIENT
Start: 2018-05-07 | End: 2018-09-24

## 2018-05-31 ENCOUNTER — OFFICE VISIT (OUTPATIENT)
Dept: FAMILY MEDICINE | Facility: CLINIC | Age: 33
End: 2018-05-31
Payer: COMMERCIAL

## 2018-05-31 ENCOUNTER — RECORDS - HEALTHEAST (OUTPATIENT)
Dept: ADMINISTRATIVE | Facility: OTHER | Age: 33
End: 2018-05-31

## 2018-05-31 VITALS
BODY MASS INDEX: 39.56 KG/M2 | HEART RATE: 68 BPM | WEIGHT: 215 LBS | OXYGEN SATURATION: 100 % | TEMPERATURE: 98.8 F | SYSTOLIC BLOOD PRESSURE: 113 MMHG | DIASTOLIC BLOOD PRESSURE: 80 MMHG | HEIGHT: 62 IN

## 2018-05-31 DIAGNOSIS — R00.2 HEART PALPITATIONS: ICD-10-CM

## 2018-05-31 DIAGNOSIS — R07.9 CHEST PAIN, UNSPECIFIED TYPE: Primary | ICD-10-CM

## 2018-05-31 DIAGNOSIS — E28.2 PCOS (POLYCYSTIC OVARIAN SYNDROME): ICD-10-CM

## 2018-05-31 DIAGNOSIS — Z78.9 ALCOHOL USE: ICD-10-CM

## 2018-05-31 LAB
BUN SERPL-MCNC: 8 MG/DL (ref 5–24)
CALCIUM SERPL-MCNC: 10 MG/DL (ref 8.5–10.4)
CHLORIDE SERPLBLD-SCNC: 104 MMOL/L (ref 94–109)
CHOLEST SERPL-MCNC: 247 MG/DL
CHOLEST/HDLC SERPL: 6.2 RATIO
CO2 SERPL-SCNC: 23 MMOL/L (ref 20–32)
CREAT SERPL-MCNC: 0.7 MG/DL (ref 0.6–1.3)
EGFR CALCULATED (BLACK REFERENCE): >90 ML/MIN
EGFR CALCULATED (NON BLACK REFERENCE): >90 ML/MIN
GLUCOSE CASUAL: 90 MG/DL (ref 51–200)
GLUCOSE SERPL-MCNC: 101 MG/DL (ref 60–109)
HDLC SERPL-MCNC: 40 MG/DL
LDLC SERPL CALC-MCNC: 139 MG/DL (ref 0–99)
POTASSIUM SERPL-SCNC: 4.1 MMOL/L (ref 3.4–5.3)
SODIUM SERPL-SCNC: 138 MMOL/L (ref 133–144)
TRIGL SERPL-MCNC: 343 MG/DL
TSH SERPL DL<=0.05 MIU/L-ACNC: 2.05 UIU/ML (ref 0.3–5)
VLDL-CHOLESTEROL: 69 MG/DL (ref 7–32)

## 2018-05-31 NOTE — NURSING NOTE
"Chief Complaint   Patient presents with     PCOS     concerns. Worried about heart issues with the PCOS.      Recheck Medication     Metformin--- has not takenm for about 2 weeks after drinking.      Medication Reconciliation     unable to review. Patient did not bring meds with.        /80  Pulse 68  Temp 98.8  F (37.1  C) (Oral)  Ht 5' 2.21\" (158 cm)  Wt 215 lb (97.5 kg)  LMP 05/28/2018  SpO2 100%  BMI 39.07 kg/m2      Patient will return for physical and pap as she is currently on her cycle and has other concerns to discuss today.   ~ Cj (Kathleen) JEFFREY. CMA'  "

## 2018-05-31 NOTE — PROGRESS NOTES
HPI:       Tg Calero is a 32 year old  female with a significant past medical history of PCOS, seasonal allergies, obesity, previous and tobacco user who presents for the new concern(s) of    PCOS  Was previously on 500mg of Metformin daily since the end of her pregnancy and has been compliant until about 2 weeks ago when she went on a alcohol binge and was concerning about possible interaction with Metformin, so she stopped. Has not take medication for about 2 weeks. Wondering if she should start taking her medication again. No recent changes in weight but is concerned that it may be causing underlying chest pain.    Chest Pain  Started approximately 1 week ago and described as a dull numbness discomfort localized over the left chest worsened with prolonged walks pain usually worsen with deep breathing not reproducible with patient with pressure over the chest wall.  Would resolve with rest and does not occur when she is normally at rest. Did have a single episode of left arm numbness but unclear if it occurred when chest pain occurred.  Report ongoing lightheadedness which is at baseline but no syncope, worsening nausea since alcohol binge, shortness of breath, weakness, changes in bowel movements or urination. Did report episodes of chest palpitations which was described as a pulling feeling in her chest with a thump and occurs intermittent but daily. No trigger. Does have a significant family history of heart disease in her grandmother and father but could not provide details. Previous smoker but quit 10 years ago. Never had anything like this before and would like to know why she is experiencing these symptoms.         PMHX:     Patient Active Problem List   Diagnosis     Allergic rhinitis due to animal dander     Irregular menses     PCOS (polycystic ovarian syndrome)     Mild persistent asthma     Vitamin D deficiency disease     Female infertility     Pap smear for cervical cancer screening      Obesity (BMI 35.0-39.9 without comorbidity)     Lesion of right ulnar nerve       Current Outpatient Prescriptions   Medication Sig Dispense Refill     albuterol (PROAIR HFA/PROVENTIL HFA/VENTOLIN HFA) 108 (90 BASE) MCG/ACT Inhaler Inhale 2 puffs into the lungs every 6 hours as needed for shortness of breath / dyspnea 3 Inhaler 1     fluticasone (FLONASE) 50 MCG/ACT spray Spray 1-2 sprays into both nostrils daily 16 g 3     fluticasone (FLOVENT HFA) 110 MCG/ACT Inhaler Inhale 2 puffs into the lungs 2 times daily 2 puffs 54 g 3     ketotifen (ZADITOR) 0.025 % SOLN ophthalmic solution Place 1 drop into both eyes every 12 hours 1 Bottle 11     metFORMIN (GLUCOPHAGE) 500 MG tablet Take 1 tablet (500 mg) by mouth 2 times daily (with meals) 180 tablet 1     montelukast (SINGULAIR) 10 MG tablet Take 1 tablet (10 mg) by mouth At Bedtime 90 tablet 3     norgestim-eth estrad triphasic (ORTHO TRI-CYCLEN LO) 0.18/0.215/0.25 MG-25 MCG per tablet Take 1 tablet by mouth daily 84 tablet 1     order for DME Equipment being ordered: carpal tunnel brace right wrist 1 each 0     Prenatal Vit-Fe Fumarate-FA (PRENATAL MULTIVITAMIN PLUS IRON) 27-0.8 MG TABS per tablet Take 1 tablet by mouth daily 100 tablet 11       Social History     Social History     Marital status: Single     Spouse name: N/A     Number of children: 2     Years of education: 13+     Occupational History     student      [employer]     Social History Main Topics     Smoking status: Former Smoker     Years: 2.00     Types: Cigarettes     Quit date: 1/1/2010     Smokeless tobacco: Never Used     Alcohol use No      Comment: every so often     Drug use: No     Sexual activity: Yes     Partners: Male     Other Topics Concern     Parent/Sibling W/ Cabg, Mi Or Angioplasty Before 65f 55m? No      Service No     Blood Transfusions No     Caffeine Concern No     Occupational Exposure No     Hobby Hazards No     Sleep Concern Yes     normal     Stress Concern No     Weight  "Concern Yes     Special Diet No     Back Care No     Exercise Yes     sort of     Bike Helmet No     N/A     Seat Belt Yes     Self-Exams Yes     Social History Narrative     Allergies   Allergen Reactions     Animal Dander      Seasonal Allergies        No results found for this or any previous visit (from the past 24 hour(s)).         Review of Systems:   C: NEGATIVE for fatigue, unexpected change in weight  I: NEGATIVE for worrisome rashes, moles or lesions  E: NEGATIVE for acute vision problems or changes  E/M: NEGATIVE for ear, mouth and throat problems  RESP: See HPI  CV: See HPI  GI: See HPI  : NEGATIVE for frequency, dysuria, or hematuria  M: NEGATIVE for claudication, myalgias  N: NEGATIVE for weakness, dizziness, syncope, headaches  P: NEGATIVE for changes in mood or affect          Physical Exam:     Vitals:    05/31/18 1027   BP: 113/80   Pulse: 68   Temp: 98.8  F (37.1  C)   TempSrc: Oral   SpO2: 100%   Weight: 215 lb (97.5 kg)   Height: 5' 2.21\" (158 cm)     Body mass index is 39.07 kg/(m^2).    GENERAL APPEARANCE: healthy, alert and no distress,  RESP: lungs clear to auscultation - no rales, rhonchi or wheezes  CV: regular rate and rhythm,  and no murmur, click,  rub or gallop, no tenderness to anterior chest palpation  ABDOMEN: soft, nontender, without hepatosplenomegaly or masses  MS: extremities normal- no gross deformities noted  NEURO: Normal strength and tone, sensory exam grossly normal, mentation appears intact and speech normal  PSYCH: mood and affect normal/bright      Assessment and Plan     1. Chest pain, unspecified type  Unclear etiology but possible musculoskeletal strain or costochondritis with walking and deep breathing, pneumonitis but less likely given no injuries or recent illness/infections. Possible CAD or pericarditis but less likely given history. Family history was significant for cardiac disease but unclear details. PCOS but no history of DM, Obesity and previous tobacco " use puts her at higher risk for cardiac issues. Discuss with patient unclear etiology but will pursue further investigation including stress testing which patient is agreeable to. EKG shows no ST or T-wave changes.  - Thyroid [place] ([facility])  - Basic Metabolic Panel ([facility])  - Lipid Panel ([facility])  - Glucose Casual ([facility])  - Exercise Stress Echocardiogram; Future  - Follow up within 1 month for CPE and recheck    2. Heart palpitations  History of sinus arrhythmia several years ago but no other rhythm issues since. Recently has been experiencing more palpitation but no lightheadedness, presyncope or syncope. Like anxiety with chest discomfort but could not rule out arrhythmias. EKG in clinic shows normal sinus rhythm with no arrhythmia or premature beats.   - Basic Metabolic Panel ([facility])  - Exercise Stress Echocardiogram; Future  - May consider a heart monitor if stress test is negative    3. PCOS (polycystic ovarian syndrome)  No acute changes but has stopped her Metformin recently due to drinking. Concerned that there may be interactions. Reassured patient that continuing medication is safe and should resume as soon as possible. Gave instruction to titrated up to maximum dose of Metformin on a weekly basis increase regiment.  - Start 500mg daily today and increase by 500mg each week until taking full amount of 1000 mg BID by fourth week    4. Alcohol use  Discussed importance of alcohol abstinence which patient agreed with. Is motivated to quit due to feeling nausea and horrible after night of binge-drinking. Did offer her resources which she declined.  - Continue to support and offer resources at future visit    Options for treatment and follow-up care were reviewed with the patient and/or guardian. Tg Calero and/or guardian engaged in the decision making process and verbalized understanding of the options discussed and agreed with the final plan.    Valdemar Tejeda MD  [facility]  Pager: [phone]    Precepted today with: Mayi Gonzalez MD

## 2018-05-31 NOTE — MR AVS SNAPSHOT
After Visit Summary   5/31/2018    Tg Calero    MRN: [id number]           Patient Information     Date Of Birth          1985        Visit Information        Provider Department      5/31/2018 10:20 AM Valdemar Tejeda MD [facility]        Today's Diagnoses     Chest pain, unspecified type    -  1    Heart palpitations        PCOS (polycystic ovarian syndrome)        Alcohol use          Care Instructions    - Continue with your current medication  - Start taking Metformin and increase by 500 every day and after each week increase by 500 until you are taking 500 twice in the morning and twice at night.    Referral for ( TEST )  :      Exercise Stress Echocardiogram  LOCATION/PLACE/Provider :    [facility]  DATE & TIME :     6-7-2018 at 10:00am  PHONE :     [phone]  FAX :     [phone]  ADDITIONAL INFORMATION :     NPO 4 hours before and no caffeine 6 hours before appointment time  Appointment made by clinic staff/:    Perla            Follow-ups after your visit        Follow-up notes from your care team     Return in about 1 month (around 6/30/2018).      Who to contact     Please call your clinic at [phone] to:    Ask questions about your health    Make or cancel appointments    Discuss your medicines    Learn about your test results    Speak to your doctor            Additional Information About Your Visit        [email or web address] Information     [employer] gives you secure access to your electronic health record. If you see a primary care provider, you can also send messages to your care team and make appointments. If you have questions, please call your primary care clinic.  If you do not have a primary care provider, please call [phone] and they will assist you.      [employer] is an electronic gateway that provides easy, online access to your medical records. With [employer], you can request a clinic appointment, read your test results, renew a  "prescription or communicate with your care team.     To access your existing account, please contact your [facility] or call [phone] for assistance.        Care EveryWhere ID     This is your Care EveryWhere ID. This could be used by other organizations to access your [place] medical records  [id number]        Your Vitals Were     Pulse Temperature Height Last Period Pulse Oximetry BMI (Body Mass Index)    68 98.8  F (37.1  C) (Oral) 5' 2.21\" (158 cm) 05/28/2018 100% 39.07 kg/m2       Blood Pressure from Last 3 Encounters:   05/31/18 113/80   12/12/17 108/71   09/22/17 111/68    Weight from Last 3 Encounters:   05/31/18 215 lb (97.5 kg)   12/12/17 223 lb 9.6 oz (101.4 kg)   09/22/17 231 lb 3.2 oz (104.9 kg)              We Performed the Following     Basic Metabolic Panel ([facility])     EKG 12-lead complete w/read - Clinics     Glucose Casual ([facility])     Lipid Panel ([facility])     Thyroid [place] ([facility])          Today's Medication Changes          These changes are accurate as of 5/31/18 11:59 PM.  If you have any questions, ask your nurse or doctor.               Stop taking these medicines if you haven't already. Please contact your care team if you have questions.     ferrous sulfate 325 (65 Fe) MG tablet   Commonly known as:  IRON   Stopped by:  Valdemar Tejeda MD                    Primary Care Provider Office Phone # Fax #    Frederick Eddy [phone]       [facility] [address] [place] [address]        Equal Access to Services     MARY THOMAS AH: Hadnilesh Francisco, christian ellis. So [facility] [phone].    ATENCIÓN: Si habla español, tiene a mann disposición servicios gratuitos de asistencia lingüística. Llame al [phone].    We comply with applicable federal civil rights laws and [place] laws. We do not discriminate on the basis of race, " color, national origin, age, disability, sex, sexual orientation, or gender identity.            Thank you!     Thank you for choosing [facility]  for your care. Our goal is always to provide you with excellent care. Hearing back from our patients is one way we can continue to improve our services. Please take a few minutes to complete the written survey that you may receive in the mail after your visit with us. Thank you!             Your Updated Medication List - Protect others around you: Learn how to safely use, store and throw away your medicines at www.disposemymeds.org.          This list is accurate as of 5/31/18 11:59 PM.  Always use your most recent med list.                   Brand Name Dispense Instructions for use Diagnosis    albuterol 108 (90 Base) MCG/ACT Inhaler    PROAIR HFA/PROVENTIL HFA/VENTOLIN HFA    3 Inhaler    Inhale 2 puffs into the lungs every 6 hours as needed for shortness of breath / dyspnea    Mild persistent asthma without complication       fluticasone 110 MCG/ACT Inhaler    FLOVENT HFA    54 g    Inhale 2 puffs into the lungs 2 times daily 2 puffs    Mild persistent asthma without complication       fluticasone 50 MCG/ACT spray    FLONASE    16 g    Spray 1-2 sprays into both nostrils daily    Mild persistent asthma without complication       ketotifen 0.025 % Soln ophthalmic solution    ZADITOR    1 Bottle    Place 1 drop into both eyes every 12 hours    Seasonal allergic conjunctivitis       metFORMIN 500 MG tablet    GLUCOPHAGE    180 tablet    Take 1 tablet (500 mg) by mouth 2 times daily (with meals)    PCOS (polycystic ovarian syndrome)       montelukast 10 MG tablet    SINGULAIR    90 tablet    Take 1 tablet (10 mg) by mouth At Bedtime    Mild persistent asthma without complication       norgestim-eth estrad triphasic 0.18/0.215/0.25 MG-25 MCG per tablet    ORTHO TRI-CYCLEN LO    84 tablet    Take 1 tablet by mouth daily    General counseling for prescription of  oral contraceptives       order for DME     1 each    Equipment being ordered: carpal tunnel brace right wrist    Tendinitis of extensor tendon of right hand       prenatal multivitamin plus iron 27-0.8 MG Tabs per tablet     100 tablet    Take 1 tablet by mouth daily    Pregnancy test positive

## 2018-05-31 NOTE — PATIENT INSTRUCTIONS
- Continue with your current medication  - Start taking Metformin and increase by 500 every day and after each week increase by 500 until you are taking 500 twice in the morning and twice at night.    Referral for ( TEST )  :      Exercise Stress Echocardiogram  LOCATION/PLACE/Provider :    [facility]  DATE & TIME :     6-7-2018 at 10:00am  PHONE :     [phone]  FAX :     [phone]  ADDITIONAL INFORMATION :     NPO 4 hours before and no caffeine 6 hours before appointment time  Appointment made by clinic staff/:    Perla

## 2018-06-01 ENCOUNTER — TELEPHONE (OUTPATIENT)
Dept: FAMILY MEDICINE | Facility: CLINIC | Age: 33
End: 2018-06-01

## 2018-06-01 NOTE — LETTER
"June 1, 2018      Tg Calero  [address]  [place] [address]        Dear Tg,    Below are some tips and hints for your elevated cholesterol. Feel free to give us a call if you have any questions.    Sincerely,    Valdemar Tejeda MD            Dietary Tips for Healthy Blood Lipids - Cholesterol & Triglycerides     Cholesterol, Good and Bad -- Your body needs a small amount of cholesterol to function properly. But we may get too much saturated fat and cholesterol in our diet -- and both raise levels of LDL \"bad\" cholesterol. LDL cholesterol can cause plaque to build up in arteries, leading to heart disease. HDL \"good\" cholesterol, on the other hand, helps clear bad cholesterol from your blood. To lower LDL cholesterol and raise HDL cholesterol, start with your diet.     Give Yourself a Hand -- Most Americans eat super-sized meals, with portions that are twice the size recommended for good health. That can contribute to weight gain & high cholesterol. Here's an easy way to practice portion control for a meal: Use your hand. One serving of meat or fish is what fits in the palm of your hand. One serving of fresh fruit is about the size of your fist. And a serving of cooked vegetables, rice, or pasta should fit in your cupped hand.     Serve Up the Heart-Healthy Food -- Load your plate with fruits and vegetables -- five to nine servings a day -- to help lower LDL \"bad\" cholesterol. Antioxidants in these foods may provide the benefit. Or it may be that when we eat more fruits and veggies, we eat less fatty foods.     Either way, you'll also help lower blood pressure and maintain a healthy weight. Foods enriched with plant sterols, such as some margarine spreads, yogurts, and other foods, can also help lower LDL cholesterol.     For heart health, look to the sea -- A heart-healthy diet has fish on the menu twice a week. Why? Fish is low in saturated fat and high in healthy omega-3 fatty acids. " "Omega-3 fatty acids help lower levels of trigylcerides, a type of fat in the blood. They may also help lower cholesterol, slowing the growth of plaque in arteries. Go for fatty fish, such as salmon, tuna, trout, and sardines. Just don't drop the filets in the deep fryer - you'll negate the health benefits.     Start Your Day With Whole Grains -- A bowl of oatmeal or whole-grain cereal has benefits that last all day. The fiber and complex carbohydrates in whole grains help you feel dawkins for longer, so you'll be less tempted to overeat at lunch. They also help reduce LDL \"bad\" cholesterol and can be an important part of your weight loss strategy. Other examples of whole grains include wild rice, popcorn, brown rice, barley, and whole-wheat flour.     Go Nuts for Cardiovascular Health -- A handful of nuts is a tasty treat that helps to lower cholesterol. Nuts are high in monounsaturated fat, which lowers LDL \"bad\" cholesterol while leaving HDL \"good\" cholesterol intact. Several studies show that people who eat about an ounce of nuts a day have lower risk of heart disease. Nuts are high in fat and calories, so only eat a handful. And make sure they're not covered in sugar or chocolate.     Unsaturated Fats Protect the Heart -- We all need a little fat in our diet - about 25% to 35% of our daily calories. But the type of fat matters. Unsaturated fats -- like those found in canola, olive, and safflower oils -- help lower LDL \"bad\" cholesterol levels and may help raise HDL \"good\" cholesterol. Saturated fats -- like those found in butter and palm oil -- and trans fats raise LDL cholesterol. Even good fats have calories, so eat in moderation.     More Beans, Fewer Potatoes -- You need carbohydrates for energy, but some do your body more good than others. Whole grains, such as brown rice, whole-wheat pasta, and beans have more fiber and raise sugar levels less. These lower cholesterol & keep you feeling full longer. "     Other carbs, like those in white bread, white potatoes, white rice, and pastries, boost blood sugar levels more quickly, making you feel hungry sooner, and increase The risk for overeating.     Move It! -- Even 30 minutes of physical activity five days a week (20 minutes three times a week for vigorous exercise, such as jogging) can help lower LDL cholesterol and raise HDL cholesterol - although more exercise is even better. It also helps you maintain an ideal weight, reducing your chance of developing clogged arteries. You don't have to exercise for 30 minutes straight - you can break it up into 10-minute increments.   Walk It Off -- If you're not used to exercising - or hate the thought of going to a gym - just go for a walk. It's easy, healthy, and all you need is a good pair of shoes. Aerobic or cardiovascular exercise such as walking lowers risk of stroke and heart disease, helps you lose weight, and keeps bones strong. If you're just starting out, try a 10-minute walk and gradually build up from there.     Work Out Without Going to the Gym -- If exercise sounds like a dirty word to you, here's some good news: You can boost your heart health by incorporating physical activity into your day. Any kind of cardiovascular activity counts - gardening, dancing, or taking the stairs instead of the elevator. Even housework can qualify as exercise - as long as you're doing serious cleaning that gets your heart rate up and not just light dusting.     Take Charge of Your Health -- If you have high cholesterol, you and your doctor may be using a number of strategies to lower cholesterol levels. You may be working on your diet, losing weight, exercising more, and maybe taking cholesterol drugs. There are other actions you can take, too, to make sure you stay on the right track.     What to Do When Eating Out -- If you're eating healthy food at home to keep cholesterol in check, don't blow it when you eat out. Restaurant  "food can be loaded with saturated fat, calories, and sodium. Even healthy choices may come in super-size portions. Try these tips to stay on track:   \" Choose broiled, baked, steamed, and grilled foods - not fried.   \" Get sauces on the side.   \" Practice portion control by asking for half your meal to be boxed up before it's brought out.   \" Look for Hidden Traps         A close look at nutrition labels is essential for a low-cholesterol, heart-healthy diet. Try these tips:   \" Check serving sizes. The nutrition info may look good, but does the package contain two servings or one?   \" If it says \"whole grain,\" read the ingredients. Whole wheat or whole grain should be the first one.   \" A food with \"0 grams cholesterol\" could still raise your LDL cholesterol. The other culprit is Saturated fat.     Don't Stress Out -- Chronic stress can raise blood pressure, adding to your risk of atherosclerosis, which occurs when plaque from cholesterol builds up in arteries. And research shows that for some people, stress might directly increase cholesterol levels. Reduce your stress levels with relaxation exercises, meditation, or biofeedback. Focus on your breathing and take deep, refreshing breaths. It's a simple stress-buster you can do anywhere.     When Losing Means Winning -- Losing weight is one of the best things you can do to fight cardiovascular disease. Being obese increases the risk of high cholesterol, high blood pressure, and type 2 diabetes. These all affect the lining of your arteries, making them more prone to collect plaque from cholesterol. Losing weight -- especially belly fat, which is linked to hardening of the arteries -- helps raise HDL \"good\" cholesterol and reduce LDL \"bad\" cholesterol.                "

## 2018-06-01 NOTE — TELEPHONE ENCOUNTER
Return patient's phone call and discuss with patient results of lipid panel, BMP and screening glucose. Lipids were slightly elevated and did discuss  Possible changes to improved cholesterol valves including increase activity, eight loss and dietary changes. Patient is doing well at home and has an appointment for her stress test on 6/7. Will return to clinic the week after to discuss results and further management/assessment at that time.    Letter provided with recommendation for elevated lipids    Valdemar Tjeeda MD  [facility]  Pager: [phone]

## 2018-06-01 NOTE — TELEPHONE ENCOUNTER
[facility] phone call message- general phone call:    Reason for call: Patient called regarding her lab results, she would like a nurse or her doctor to call her and give results. She also stated that she don't see them in [facility].     Return call needed: Yes    OK to leave a message on voice mail? Yes    Primary language: English      needed? No    Call taken on June 1, 2018 at 8:43 AM by Cecilia Webb

## 2018-06-07 ENCOUNTER — HOSPITAL ENCOUNTER (OUTPATIENT)
Dept: CARDIOLOGY | Facility: HOSPITAL | Age: 33
Discharge: HOME | End: 2018-06-07

## 2018-06-07 ENCOUNTER — COMMUNICATION - HEALTHEAST (OUTPATIENT)
Dept: TELEHEALTH | Facility: CLINIC | Age: 33
End: 2018-06-07

## 2018-06-07 DIAGNOSIS — R07.9 CHEST PAIN, UNSPECIFIED TYPE: ICD-10-CM

## 2018-06-07 LAB
CV STRESS CURRENT BP HE: NORMAL
CV STRESS CURRENT HR HE: 100
CV STRESS CURRENT HR HE: 103
CV STRESS CURRENT HR HE: 106
CV STRESS CURRENT HR HE: 109
CV STRESS CURRENT HR HE: 113
CV STRESS CURRENT HR HE: 113
CV STRESS CURRENT HR HE: 114
CV STRESS CURRENT HR HE: 122
CV STRESS CURRENT HR HE: 123
CV STRESS CURRENT HR HE: 124
CV STRESS CURRENT HR HE: 133
CV STRESS CURRENT HR HE: 147
CV STRESS CURRENT HR HE: 155
CV STRESS CURRENT HR HE: 164
CV STRESS CURRENT HR HE: 169
CV STRESS CURRENT HR HE: 170
CV STRESS CURRENT HR HE: 77
CV STRESS CURRENT HR HE: 80
CV STRESS CURRENT HR HE: 82
CV STRESS CURRENT HR HE: 87
CV STRESS CURRENT HR HE: 92
CV STRESS CURRENT HR HE: 93
CV STRESS CURRENT HR HE: 95
CV STRESS CURRENT HR HE: 96
CV STRESS CURRENT HR HE: 97
CV STRESS CURRENT HR HE: 97
CV STRESS CURRENT HR HE: 99
CV STRESS DEVIATION TIME HE: NORMAL
CV STRESS ECHO PERCENT HR HE: NORMAL
CV STRESS EXERCISE STAGE HE: NORMAL
CV STRESS FINAL RESTING BP HE: NORMAL
CV STRESS FINAL RESTING HR HE: 100
CV STRESS MAX HR HE: 170
CV STRESS MAX TREADMILL GRADE HE: 14
CV STRESS MAX TREADMILL SPEED HE: 3.4
CV STRESS PEAK DIA BP HE: NORMAL
CV STRESS PEAK SYS BP HE: NORMAL
CV STRESS PHASE HE: NORMAL
CV STRESS PROTOCOL HE: NORMAL
CV STRESS RESTING PT POSITION HE: NORMAL
CV STRESS RESTING PT POSITION HE: NORMAL
CV STRESS ST DEVIATION AMOUNT HE: NORMAL
CV STRESS ST DEVIATION ELEVATION HE: NORMAL
CV STRESS ST EVELATION AMOUNT HE: NORMAL
CV STRESS TEST TYPE HE: NORMAL
CV STRESS TOTAL STAGE TIME MIN 1 HE: NORMAL
STRESS ECHO BASELINE BP: NORMAL
STRESS ECHO BASELINE HR: 79
STRESS ECHO CALCULATED PERCENT HR: 90 %
STRESS ECHO LAST STRESS BP: NORMAL
STRESS ECHO LAST STRESS HR: 164
STRESS ECHO POST ESTIMATED WORKLOAD: 10.3
STRESS ECHO POST EXERCISE DUR MIN: 8
STRESS ECHO POST EXERCISE DUR SEC: 35
STRESS ECHO TARGET HR: 160

## 2018-06-08 DIAGNOSIS — R07.9 CHEST PAIN, UNSPECIFIED TYPE: ICD-10-CM

## 2018-06-11 ENCOUNTER — TELEPHONE (OUTPATIENT)
Dept: FAMILY MEDICINE | Facility: CLINIC | Age: 33
End: 2018-06-11

## 2018-06-11 NOTE — TELEPHONE ENCOUNTER
Call patient today about stress test results which was negative for inducible ischemia. Patient was seen in ED on 6/8 for chest pain which has since resolved. Likely thinking its musculoskeletal given recent increase in activity. Continue with current medication and activities.    Thinking about switching to new clinic and unsure if she will follow up with us in our clinic. Did recommended follow up for chest pain and obtain medical record prior to transition if possible.    Valdemar Tejeda MD  [facility]  Pager: [phone]

## 2018-06-14 NOTE — PROGRESS NOTES
Preceptor Attestation:   Patient seen, evaluated and discussed with the resident. I have verified the content of the note, which accurately reflects my assessment of the patient and the plan of care. I reviewed the EKG and concur with the diagnosis.  Supervising Physician:Mayi Gonzalez MD  [facility]

## 2018-06-20 ENCOUNTER — TELEPHONE (OUTPATIENT)
Dept: FAMILY MEDICINE | Facility: CLINIC | Age: 33
End: 2018-06-20

## 2018-06-20 NOTE — TELEPHONE ENCOUNTER
Ed follow up    Attempted to reach pt as she was seen in ED at [facility]  Message left to schedule follow up      Lbetz

## 2018-06-22 ENCOUNTER — TELEPHONE (OUTPATIENT)
Dept: FAMILY MEDICINE | Facility: CLINIC | Age: 33
End: 2018-06-22

## 2018-06-25 ENCOUNTER — TELEPHONE (OUTPATIENT)
Dept: FAMILY MEDICINE | Facility: CLINIC | Age: 33
End: 2018-06-25

## 2018-06-25 NOTE — TELEPHONE ENCOUNTER
ED follow up  Seen at [facility] for chest pain    Attempt 3 to reach - left message reminded of our 24 hr line and encouraged her to come in and follow up.  Beto

## 2018-07-16 DIAGNOSIS — E28.2 PCOS (POLYCYSTIC OVARIAN SYNDROME): ICD-10-CM

## 2018-08-02 ENCOUNTER — TELEPHONE (OUTPATIENT)
Dept: FAMILY MEDICINE | Facility: CLINIC | Age: 33
End: 2018-08-02

## 2018-08-02 NOTE — TELEPHONE ENCOUNTER
[facility] phone call message- medication clarification/question:    Full Medication Name: metFORMIN (GLUCOPHAGE) 500 MG tablet    Question: Patient states Dr. Tejeda was suppose to change the doses on this medication but never did. Patient is out of it for four days now and not sure how much longer she can go without it. Insurance is also no longer covering this prescription. Please call and advise.     Pharmacy confirmed as [facility] [address] [place] [address] [address] AT [facility] [address]: Yes    OK to leave a message on voice mail? Yes    Primary language: English      needed? No    Call taken on August 2, 2018 at 10:27 AM by Cecilia Webb

## 2018-08-02 NOTE — TELEPHONE ENCOUNTER
Called inform pt that MD sent new Rx refill on 7/23/18 for 2 tablets BID. Pt said she'll call pharmacy to verify on sig.

## 2018-09-19 DIAGNOSIS — J45.30 MILD PERSISTENT ASTHMA WITHOUT COMPLICATION: ICD-10-CM

## 2018-09-19 RX ORDER — ALBUTEROL SULFATE 90 UG/1
2 AEROSOL, METERED RESPIRATORY (INHALATION) EVERY 6 HOURS PRN
Qty: 54 G | Refills: 1 | Status: SHIPPED | OUTPATIENT
Start: 2018-09-19

## 2018-09-19 NOTE — TELEPHONE ENCOUNTER
Message to physician:     Date of last visit: 5/31/2018     Date of next visit if scheduled: Visit date not found       Last Comprehensive Metabolic Panel:  Sodium   Date Value Ref Range Status   05/31/2018 138.0 133.0 - 144.0 mmol/L Final     Potassium   Date Value Ref Range Status   05/31/2018 4.1 3.4 - 5.3 mmol/L Final     Chloride   Date Value Ref Range Status   05/31/2018 104.0 94.0 - 109.0 mmol/L Final     Carbon Dioxide   Date Value Ref Range Status   05/31/2018 23.0 20.0 - 32.0 mmol/L Final     Anion Gap   Date Value Ref Range Status   01/16/2014 11 6 - 17 mmol/L Final     Glucose   Date Value Ref Range Status   05/31/2018 101.0 60.0 - 109.0 mg/dL Final     Urea Nitrogen   Date Value Ref Range Status   05/31/2018 8.0 5.0 - 24.0 mg/dL Final     Creatinine   Date Value Ref Range Status   05/31/2018 0.7 0.6 - 1.3 mg/dL Final     GFR Estimate   Date Value Ref Range Status   01/16/2014 >90 >60 mL/min/1.7m2 Final     Calcium   Date Value Ref Range Status   05/31/2018 10.0 8.5 - 10.4 mg/dL Final       BP Readings from Last 3 Encounters:   05/31/18 113/80   12/12/17 108/71   09/22/17 111/68       Lab Results   Component Value Date    A1C 5.2 03/31/2017    A1C 5.6 03/24/2017    A1C 5.5 04/09/2015    A1C 5.8 01/16/2014                Please complete refill and CLOSE ENCOUNTER.  Closing the encounter signifies the refill is complete.

## 2018-09-24 DIAGNOSIS — Z30.09 GENERAL COUNSELING FOR PRESCRIPTION OF ORAL CONTRACEPTIVES: ICD-10-CM

## 2018-09-24 NOTE — TELEPHONE ENCOUNTER
[facility] phone call message- patient requesting a refill:    Full Medication Name: ORTHO TRI-CYCLEN    Dose:     Pharmacy confirmed as   [id number] - [place], [place] - [address] AT [facility] [address]  [address] [facility] [address]  Phone: [phone] Fax: [phone]    [facility] [address] [facility] [address] [facility]  [id number] [place] [address]  Phone: [phone] Fax: [phone]  : Yes    Additional Comments: Pt stated she misplaced her birth control pills and requested a refill since she hasn't been able to find it. Notified it may take 2 business days. Please call and advise.      OK to leave a message on voice mail? Yes    Primary language: English      needed? No    Call taken on September 24, 2018 at 10:35 AM by Saba Rosen

## 2018-09-25 RX ORDER — NORGESTIMATE AND ETHINYL ESTRADIOL 7DAYSX3 LO
1 KIT ORAL DAILY
Qty: 84 TABLET | Refills: 1 | Status: SHIPPED | OUTPATIENT
Start: 2018-09-25

## 2019-02-15 ENCOUNTER — TELEPHONE (OUTPATIENT)
Dept: FAMILY MEDICINE | Facility: CLINIC | Age: 34
End: 2019-02-15

## 2019-03-28 ENCOUNTER — TELEPHONE (OUTPATIENT)
Dept: FAMILY MEDICINE | Facility: CLINIC | Age: 34
End: 2019-03-28

## 2019-05-17 ENCOUNTER — TELEPHONE (OUTPATIENT)
Dept: FAMILY MEDICINE | Facility: CLINIC | Age: 34
End: 2019-05-17

## 2019-11-26 DIAGNOSIS — Z32.01 PREGNANCY TEST POSITIVE: ICD-10-CM

## 2019-11-26 NOTE — TELEPHONE ENCOUNTER
Message to physician: Prenatal     Date of last visit: 5/31/2018     Date of next visit if scheduled: Visit date not found       Last Comprehensive Metabolic Panel:  Sodium   Date Value Ref Range Status   05/31/2018 138.0 133.0 - 144.0 mmol/L Final     Potassium   Date Value Ref Range Status   05/31/2018 4.1 3.4 - 5.3 mmol/L Final     Chloride   Date Value Ref Range Status   05/31/2018 104.0 94.0 - 109.0 mmol/L Final     Carbon Dioxide   Date Value Ref Range Status   05/31/2018 23.0 20.0 - 32.0 mmol/L Final     Anion Gap   Date Value Ref Range Status   01/16/2014 11 6 - 17 mmol/L Final     Glucose   Date Value Ref Range Status   05/31/2018 101.0 60.0 - 109.0 mg/dL Final     Urea Nitrogen   Date Value Ref Range Status   05/31/2018 8.0 5.0 - 24.0 mg/dL Final     Creatinine   Date Value Ref Range Status   05/31/2018 0.7 0.6 - 1.3 mg/dL Final     GFR Estimate   Date Value Ref Range Status   01/16/2014 >90 >60 mL/min/1.7m2 Final     Calcium   Date Value Ref Range Status   05/31/2018 10.0 8.5 - 10.4 mg/dL Final       BP Readings from Last 3 Encounters:   05/31/18 113/80   12/12/17 108/71   09/22/17 111/68       Lab Results   Component Value Date    A1C 5.2 03/31/2017    A1C 5.6 03/24/2017    A1C 5.5 04/09/2015    A1C 5.8 01/16/2014                Please complete refill and CLOSE ENCOUNTER.  Closing the encounter signifies the refill is complete.

## 2019-12-01 RX ORDER — PRENATAL VIT/IRON FUM/FOLIC AC 27MG-0.8MG
1 TABLET ORAL DAILY
Qty: 100 TABLET | Refills: 3 | Status: SHIPPED | OUTPATIENT
Start: 2019-12-01

## 2020-02-17 ENCOUNTER — HEALTH MAINTENANCE LETTER (OUTPATIENT)
Age: 35
End: 2020-02-17

## 2020-11-29 ENCOUNTER — HEALTH MAINTENANCE LETTER (OUTPATIENT)
Age: 35
End: 2020-11-29

## 2021-04-10 ENCOUNTER — HEALTH MAINTENANCE LETTER (OUTPATIENT)
Age: 36
End: 2021-04-10

## 2021-06-11 NOTE — PROGRESS NOTES
Patient presented to [facility] at 0120 during computer downtime with decreased fetal movement at 25 5/7 weeks.  EFM applied, vs as noted.  US shows active fetus.  Dr. DELFINO Johnson notified at 0130.  Patient began feeling movement at 0140.  Instructed regarding doing daily kick counts and to follow up with Dr. Wright on next scheduled appointment.  Also instructed to call with any questions or concerns.  Patient verbalized understanding.  Discharged to home at 0150.

## 2021-06-13 NOTE — ANESTHESIA PROCEDURE NOTES
Epidural Block    Patient location during procedure: OB  Time Called: 9/26/2017 5:48 PM  Reason for Block:labor epidural  Staffing:  Performing  Anesthesiologist: CATARINO LOPEZ  Preanesthetic Checklist  Completed: patient identified, risks, benefits, and alternatives discussed, timeout performed, consent obtained, at patient's request, airway assessed, oxygen available, suction available, emergency drugs available and hand hygiene performed  Procedure  Patient position: sitting  Prep: ChloraPrep  Patient monitoring: continuous pulse oximetry, heart rate and blood pressure  Approach: midline  Location: L3-L4  Injection technique: ANDRES saline  Number of Attempts:1  Needle  Needle type: [employer]   Needle gauge: 18 G     Catheter in Space: 6  Assessment  Sensory level: Other  No complications      Additional Notes:  Patient requests labor epidural. Chart reviewed, including labs. Reviewed options and risks with the patient, including but not limited to: bleeding, infection, damage to tissues under the skin (nerves, muscles, blood vessels), hypotension, headache, and epidural failure. Questions answered, consent signed. Patient agrees to elective labor epidural.     Hands washed, sterile technique used, including scrub hat, mask, and gloves.     Test dose negative for heme/SAB.

## 2021-06-13 NOTE — ANESTHESIA PROCEDURE NOTES
Epidural Block    Patient location during procedure: OB  Time Called: 9/26/2017 6:55 PM  Reason for Block:labor epidural  Staffing:  Performing  Anesthesiologist: CATARINO LOPEZ  Preanesthetic Checklist  Completed: patient identified, risks, benefits, and alternatives discussed, timeout performed, consent obtained, at patient's request, airway assessed, oxygen available, suction available, emergency drugs available and hand hygiene performed  Procedure  Patient position: sitting  Prep: ChloraPrep  Patient monitoring: continuous pulse oximetry, heart rate and blood pressure  Approach: midline  Location: L2-L3  Injection technique: ANDRES saline  Number of Attempts:1  Needle  Needle type: Huan Xiong   Needle gauge: 18 G     Catheter in Space: 6  Assessment  Sensory level:  No complications      Additional Notes:  Prior epidural functioning poorly, with right leg numbness, but no significant relief from labor pain, and continued intact cold sensation across abdomen. Epidural pulled. Tip intact.    Sterile technique used, epidural replaced smoothly on 1st attempt at L2-3. Test dose negative for heme/SAB. 6ml 0.25% bupivacaine given, with good pain relief. Hypotension also noted, requiring ephedrine x2. FHT good throughout the hypotension. Patient reports good pain relief.

## 2021-06-13 NOTE — ANESTHESIA PREPROCEDURE EVALUATION
Anesthesia Evaluation      Patient summary reviewed   No history of anesthetic complications     Airway   Mallampati: III   Pulmonary - normal exam   (+) asthma  moderate,  well controlled,   (-) not a smoker                         Cardiovascular - negative ROS and normal exam   Neuro/Psych    (+) seizures (only as a child) well controlled,     Endo/Other    (+) obesity (BMI 42 (was greater than 35 before pregnancy)), pregnant     GI/Hepatic/Renal    (+) GERD well controlled and intermittent,        Other findings: Physiologic changes of pregnancy      Dental - normal exam                        Anesthesia Plan  Planned anesthetic: epidural    ASA 3     Anesthetic plan and risks discussed with: patient and significant other    Post-op plan: epidural analgesia and routine recovery      Patient requests labor epidural. Chart reviewed, including labs. Reviewed options and risks with the patient, including but not limited to: bleeding, infection, damage to tissues under the skin (nerves, muscles, blood vessels), hypotension, headache, and epidural failure. Questions answered, consent signed. Patient agrees to elective labor epidural.       Lab Results   Component Value Date    WBC 6.7 08/03/2016    HGB 13.6 08/03/2016    HCT 39.4 08/03/2016    MCV 95 08/03/2016     08/03/2016

## 2021-09-25 ENCOUNTER — HEALTH MAINTENANCE LETTER (OUTPATIENT)
Age: 36
End: 2021-09-25

## 2022-05-07 ENCOUNTER — HEALTH MAINTENANCE LETTER (OUTPATIENT)
Age: 37
End: 2022-05-07

## 2022-12-26 ENCOUNTER — HEALTH MAINTENANCE LETTER (OUTPATIENT)
Age: 37
End: 2022-12-26

## 2023-06-02 ENCOUNTER — HEALTH MAINTENANCE LETTER (OUTPATIENT)
Age: 38
End: 2023-06-02